# Patient Record
Sex: MALE | Race: WHITE | Employment: OTHER | URBAN - METROPOLITAN AREA
[De-identification: names, ages, dates, MRNs, and addresses within clinical notes are randomized per-mention and may not be internally consistent; named-entity substitution may affect disease eponyms.]

---

## 2022-06-16 ENCOUNTER — ANESTHESIA (OUTPATIENT)
Dept: ENDOSCOPY | Age: 87
DRG: 377 | End: 2022-06-16
Payer: MEDICARE

## 2022-06-16 ENCOUNTER — HOSPITAL ENCOUNTER (INPATIENT)
Age: 87
LOS: 4 days | Discharge: HOME HEALTH | DRG: 377 | End: 2022-06-20
Attending: INTERNAL MEDICINE | Admitting: INTERNAL MEDICINE
Payer: MEDICARE

## 2022-06-16 ENCOUNTER — ANESTHESIA EVENT (OUTPATIENT)
Dept: ENDOSCOPY | Age: 87
DRG: 377 | End: 2022-06-16
Payer: MEDICARE

## 2022-06-16 PROBLEM — K25.9 GASTRIC ULCER: Status: ACTIVE | Noted: 2022-06-16

## 2022-06-16 PROBLEM — I48.91 ATRIAL FIBRILLATION (HCC): Status: ACTIVE | Noted: 2022-06-16

## 2022-06-16 PROBLEM — I10 ESSENTIAL HYPERTENSION: Status: ACTIVE | Noted: 2022-06-16

## 2022-06-16 PROBLEM — N40.0 BPH (BENIGN PROSTATIC HYPERPLASIA): Status: ACTIVE | Noted: 2022-06-16

## 2022-06-16 PROBLEM — I48.91 ATRIAL FIBRILLATION WITH RAPID VENTRICULAR RESPONSE (HCC): Status: ACTIVE | Noted: 2022-06-16

## 2022-06-16 PROBLEM — J44.9 COPD (CHRONIC OBSTRUCTIVE PULMONARY DISEASE) (HCC): Status: ACTIVE | Noted: 2022-06-16

## 2022-06-16 PROBLEM — K92.2 GI BLEED: Status: ACTIVE | Noted: 2022-06-16

## 2022-06-16 PROBLEM — I50.32 CHRONIC DIASTOLIC (CONGESTIVE) HEART FAILURE (HCC): Status: ACTIVE | Noted: 2022-06-16

## 2022-06-16 PROBLEM — I35.0 AORTIC STENOSIS: Status: ACTIVE | Noted: 2022-06-16

## 2022-06-16 PROBLEM — I25.10 CAD (CORONARY ARTERY DISEASE): Status: ACTIVE | Noted: 2022-06-16

## 2022-06-16 PROBLEM — E03.9 ACQUIRED HYPOTHYROIDISM: Status: ACTIVE | Noted: 2022-06-16

## 2022-06-16 LAB
A/G RATIO: 1.1 (ref 1.1–2.2)
ALBUMIN SERPL-MCNC: 2.7 G/DL (ref 3.4–5)
ALP BLD-CCNC: 51 U/L (ref 40–129)
ALT SERPL-CCNC: 6 U/L (ref 10–40)
ANION GAP SERPL CALCULATED.3IONS-SCNC: 7 MMOL/L (ref 3–16)
AST SERPL-CCNC: 13 U/L (ref 15–37)
BASOPHILS ABSOLUTE: 0 K/UL (ref 0–0.2)
BASOPHILS RELATIVE PERCENT: 0.5 %
BILIRUB SERPL-MCNC: 0.4 MG/DL (ref 0–1)
BUN BLDV-MCNC: 28 MG/DL (ref 7–20)
CALCIUM SERPL-MCNC: 8.2 MG/DL (ref 8.3–10.6)
CHLORIDE BLD-SCNC: 105 MMOL/L (ref 99–110)
CO2: 27 MMOL/L (ref 21–32)
CREAT SERPL-MCNC: 1.1 MG/DL (ref 0.8–1.3)
EOSINOPHILS ABSOLUTE: 0.4 K/UL (ref 0–0.6)
EOSINOPHILS RELATIVE PERCENT: 4.6 %
GFR AFRICAN AMERICAN: >60
GFR NON-AFRICAN AMERICAN: >60
GLUCOSE BLD-MCNC: 109 MG/DL (ref 70–99)
HCT VFR BLD CALC: 23.4 % (ref 40.5–52.5)
HCT VFR BLD CALC: 23.6 % (ref 40.5–52.5)
HCT VFR BLD CALC: 24.3 % (ref 40.5–52.5)
HCT VFR BLD CALC: 25.1 % (ref 40.5–52.5)
HEMOGLOBIN: 7.6 G/DL (ref 13.5–17.5)
HEMOGLOBIN: 7.7 G/DL (ref 13.5–17.5)
HEMOGLOBIN: 8.1 G/DL (ref 13.5–17.5)
HEMOGLOBIN: 8.1 G/DL (ref 13.5–17.5)
LYMPHOCYTES ABSOLUTE: 0.7 K/UL (ref 1–5.1)
LYMPHOCYTES RELATIVE PERCENT: 9.4 %
MCH RBC QN AUTO: 28.9 PG (ref 26–34)
MCHC RBC AUTO-ENTMCNC: 33.4 G/DL (ref 31–36)
MCV RBC AUTO: 86.6 FL (ref 80–100)
MONOCYTES ABSOLUTE: 0.6 K/UL (ref 0–1.3)
MONOCYTES RELATIVE PERCENT: 7.3 %
NEUTROPHILS ABSOLUTE: 6.1 K/UL (ref 1.7–7.7)
NEUTROPHILS RELATIVE PERCENT: 78.2 %
PDW BLD-RTO: 17.3 % (ref 12.4–15.4)
PLATELET # BLD: 237 K/UL (ref 135–450)
PMV BLD AUTO: 7.1 FL (ref 5–10.5)
POTASSIUM REFLEX MAGNESIUM: 4.5 MMOL/L (ref 3.5–5.1)
RBC # BLD: 2.8 M/UL (ref 4.2–5.9)
SODIUM BLD-SCNC: 139 MMOL/L (ref 136–145)
T4 FREE: 1.3 NG/DL (ref 0.9–1.8)
TOTAL PROTEIN: 5.1 G/DL (ref 6.4–8.2)
TSH REFLEX: 7.29 UIU/ML (ref 0.27–4.2)
WBC # BLD: 7.8 K/UL (ref 4–11)

## 2022-06-16 PROCEDURE — 1200000000 HC SEMI PRIVATE

## 2022-06-16 PROCEDURE — 6370000000 HC RX 637 (ALT 250 FOR IP): Performed by: INTERNAL MEDICINE

## 2022-06-16 PROCEDURE — 6360000002 HC RX W HCPCS: Performed by: NURSE ANESTHETIST, CERTIFIED REGISTERED

## 2022-06-16 PROCEDURE — 6360000002 HC RX W HCPCS: Performed by: INTERNAL MEDICINE

## 2022-06-16 PROCEDURE — 7100000011 HC PHASE II RECOVERY - ADDTL 15 MIN: Performed by: INTERNAL MEDICINE

## 2022-06-16 PROCEDURE — 99223 1ST HOSP IP/OBS HIGH 75: CPT | Performed by: NURSE PRACTITIONER

## 2022-06-16 PROCEDURE — 94761 N-INVAS EAR/PLS OXIMETRY MLT: CPT

## 2022-06-16 PROCEDURE — 85014 HEMATOCRIT: CPT

## 2022-06-16 PROCEDURE — 2700000000 HC OXYGEN THERAPY PER DAY

## 2022-06-16 PROCEDURE — 85025 COMPLETE CBC W/AUTO DIFF WBC: CPT

## 2022-06-16 PROCEDURE — C9113 INJ PANTOPRAZOLE SODIUM, VIA: HCPCS | Performed by: INTERNAL MEDICINE

## 2022-06-16 PROCEDURE — 0DJ08ZZ INSPECTION OF UPPER INTESTINAL TRACT, VIA NATURAL OR ARTIFICIAL OPENING ENDOSCOPIC: ICD-10-PCS | Performed by: INTERNAL MEDICINE

## 2022-06-16 PROCEDURE — 2580000003 HC RX 258: Performed by: INTERNAL MEDICINE

## 2022-06-16 PROCEDURE — 3700000000 HC ANESTHESIA ATTENDED CARE: Performed by: INTERNAL MEDICINE

## 2022-06-16 PROCEDURE — 94640 AIRWAY INHALATION TREATMENT: CPT

## 2022-06-16 PROCEDURE — 36415 COLL VENOUS BLD VENIPUNCTURE: CPT

## 2022-06-16 PROCEDURE — 2500000003 HC RX 250 WO HCPCS: Performed by: NURSE ANESTHETIST, CERTIFIED REGISTERED

## 2022-06-16 PROCEDURE — 80053 COMPREHEN METABOLIC PANEL: CPT

## 2022-06-16 PROCEDURE — 7100000010 HC PHASE II RECOVERY - FIRST 15 MIN: Performed by: INTERNAL MEDICINE

## 2022-06-16 PROCEDURE — 84443 ASSAY THYROID STIM HORMONE: CPT

## 2022-06-16 PROCEDURE — 3700000001 HC ADD 15 MINUTES (ANESTHESIA): Performed by: INTERNAL MEDICINE

## 2022-06-16 PROCEDURE — 85018 HEMOGLOBIN: CPT

## 2022-06-16 PROCEDURE — 84439 ASSAY OF FREE THYROXINE: CPT

## 2022-06-16 PROCEDURE — 2500000003 HC RX 250 WO HCPCS: Performed by: INTERNAL MEDICINE

## 2022-06-16 PROCEDURE — 2580000003 HC RX 258: Performed by: NURSE ANESTHETIST, CERTIFIED REGISTERED

## 2022-06-16 PROCEDURE — 3609017100 HC EGD: Performed by: INTERNAL MEDICINE

## 2022-06-16 PROCEDURE — 6370000000 HC RX 637 (ALT 250 FOR IP): Performed by: NURSE PRACTITIONER

## 2022-06-16 RX ORDER — LEVOTHYROXINE SODIUM 0.05 MG/1
50 TABLET ORAL DAILY
Status: DISCONTINUED | OUTPATIENT
Start: 2022-06-16 | End: 2022-06-20 | Stop reason: HOSPADM

## 2022-06-16 RX ORDER — DILTIAZEM HYDROCHLORIDE 120 MG/1
120 CAPSULE, EXTENDED RELEASE ORAL 2 TIMES DAILY
COMMUNITY

## 2022-06-16 RX ORDER — TAMSULOSIN HYDROCHLORIDE 0.4 MG/1
CAPSULE ORAL
COMMUNITY

## 2022-06-16 RX ORDER — DILTIAZEM HYDROCHLORIDE 60 MG/1
60 TABLET, FILM COATED ORAL EVERY 6 HOURS SCHEDULED
Status: DISCONTINUED | OUTPATIENT
Start: 2022-06-16 | End: 2022-06-20

## 2022-06-16 RX ORDER — ONDANSETRON 2 MG/ML
4 INJECTION INTRAMUSCULAR; INTRAVENOUS EVERY 4 HOURS PRN
Status: DISCONTINUED | OUTPATIENT
Start: 2022-06-16 | End: 2022-06-20 | Stop reason: HOSPADM

## 2022-06-16 RX ORDER — APIXABAN 5 MG/1
5 TABLET, FILM COATED ORAL 2 TIMES DAILY
Status: ON HOLD | COMMUNITY
End: 2022-06-20 | Stop reason: HOSPADM

## 2022-06-16 RX ORDER — LANOLIN ALCOHOL/MO/W.PET/CERES
325 CREAM (GRAM) TOPICAL 2 TIMES DAILY
COMMUNITY

## 2022-06-16 RX ORDER — LIDOCAINE HYDROCHLORIDE 20 MG/ML
INJECTION, SOLUTION INFILTRATION; PERINEURAL PRN
Status: DISCONTINUED | OUTPATIENT
Start: 2022-06-16 | End: 2022-06-16 | Stop reason: SDUPTHER

## 2022-06-16 RX ORDER — SODIUM CHLORIDE 9 MG/ML
INJECTION, SOLUTION INTRAVENOUS PRN
Status: DISCONTINUED | OUTPATIENT
Start: 2022-06-16 | End: 2022-06-20 | Stop reason: HOSPADM

## 2022-06-16 RX ORDER — TAMSULOSIN HYDROCHLORIDE 0.4 MG/1
0.4 CAPSULE ORAL DAILY
Status: DISCONTINUED | OUTPATIENT
Start: 2022-06-16 | End: 2022-06-20 | Stop reason: HOSPADM

## 2022-06-16 RX ORDER — METOPROLOL SUCCINATE 100 MG/1
100 TABLET, EXTENDED RELEASE ORAL DAILY
Status: DISCONTINUED | OUTPATIENT
Start: 2022-06-16 | End: 2022-06-20 | Stop reason: HOSPADM

## 2022-06-16 RX ORDER — TIOTROPIUM BROMIDE INHALATION SPRAY 3.12 UG/1
SPRAY, METERED RESPIRATORY (INHALATION)
COMMUNITY

## 2022-06-16 RX ORDER — ALBUTEROL SULFATE 2.5 MG/3ML
2.5 SOLUTION RESPIRATORY (INHALATION) EVERY 6 HOURS PRN
Status: DISCONTINUED | OUTPATIENT
Start: 2022-06-16 | End: 2022-06-19

## 2022-06-16 RX ORDER — SODIUM CHLORIDE 9 MG/ML
INJECTION, SOLUTION INTRAVENOUS CONTINUOUS
Status: DISCONTINUED | OUTPATIENT
Start: 2022-06-16 | End: 2022-06-17

## 2022-06-16 RX ORDER — ACETAMINOPHEN 325 MG/1
650 TABLET ORAL EVERY 4 HOURS PRN
Status: DISCONTINUED | OUTPATIENT
Start: 2022-06-16 | End: 2022-06-20 | Stop reason: HOSPADM

## 2022-06-16 RX ORDER — OMEPRAZOLE 40 MG/1
40 CAPSULE, DELAYED RELEASE ORAL DAILY
COMMUNITY

## 2022-06-16 RX ORDER — FUROSEMIDE 20 MG/1
20 TABLET ORAL 2 TIMES DAILY
COMMUNITY

## 2022-06-16 RX ORDER — ACETAMINOPHEN 650 MG/1
650 SUPPOSITORY RECTAL EVERY 4 HOURS PRN
Status: DISCONTINUED | OUTPATIENT
Start: 2022-06-16 | End: 2022-06-20 | Stop reason: HOSPADM

## 2022-06-16 RX ORDER — CALCIUM CARBONATE 200(500)MG
500 TABLET,CHEWABLE ORAL 3 TIMES DAILY PRN
Status: DISCONTINUED | OUTPATIENT
Start: 2022-06-16 | End: 2022-06-20 | Stop reason: HOSPADM

## 2022-06-16 RX ORDER — PANTOPRAZOLE SODIUM 40 MG/10ML
40 INJECTION, POWDER, LYOPHILIZED, FOR SOLUTION INTRAVENOUS 2 TIMES DAILY
Status: DISCONTINUED | OUTPATIENT
Start: 2022-06-16 | End: 2022-06-18 | Stop reason: ALTCHOICE

## 2022-06-16 RX ORDER — ALBUTEROL SULFATE 90 UG/1
1-2 AEROSOL, METERED RESPIRATORY (INHALATION) EVERY 6 HOURS PRN
COMMUNITY

## 2022-06-16 RX ORDER — POLYETHYLENE GLYCOL 3350 17 G/17G
17 POWDER, FOR SOLUTION ORAL DAILY PRN
Status: DISCONTINUED | OUTPATIENT
Start: 2022-06-16 | End: 2022-06-20 | Stop reason: HOSPADM

## 2022-06-16 RX ORDER — SODIUM CHLORIDE 0.9 % (FLUSH) 0.9 %
10 SYRINGE (ML) INJECTION PRN
Status: DISCONTINUED | OUTPATIENT
Start: 2022-06-16 | End: 2022-06-20 | Stop reason: HOSPADM

## 2022-06-16 RX ORDER — SODIUM CHLORIDE 0.9 % (FLUSH) 0.9 %
10 SYRINGE (ML) INJECTION EVERY 12 HOURS SCHEDULED
Status: DISCONTINUED | OUTPATIENT
Start: 2022-06-16 | End: 2022-06-20 | Stop reason: HOSPADM

## 2022-06-16 RX ORDER — SODIUM CHLORIDE, SODIUM LACTATE, POTASSIUM CHLORIDE, CALCIUM CHLORIDE 600; 310; 30; 20 MG/100ML; MG/100ML; MG/100ML; MG/100ML
INJECTION, SOLUTION INTRAVENOUS CONTINUOUS PRN
Status: DISCONTINUED | OUTPATIENT
Start: 2022-06-16 | End: 2022-06-16 | Stop reason: SDUPTHER

## 2022-06-16 RX ORDER — PROPOFOL 10 MG/ML
INJECTION, EMULSION INTRAVENOUS CONTINUOUS PRN
Status: DISCONTINUED | OUTPATIENT
Start: 2022-06-16 | End: 2022-06-16 | Stop reason: SDUPTHER

## 2022-06-16 RX ORDER — METOPROLOL SUCCINATE 100 MG/1
100 TABLET, EXTENDED RELEASE ORAL DAILY
COMMUNITY

## 2022-06-16 RX ORDER — FLUTICASONE PROPIONATE 100 MCG
1 BLISTER, WITH INHALATION DEVICE INHALATION 2 TIMES DAILY
COMMUNITY

## 2022-06-16 RX ORDER — LEVOTHYROXINE SODIUM 50 UG/1
50 TABLET ORAL DAILY
COMMUNITY

## 2022-06-16 RX ADMIN — METOPROLOL SUCCINATE 100 MG: 100 TABLET, EXTENDED RELEASE ORAL at 08:27

## 2022-06-16 RX ADMIN — DILTIAZEM HYDROCHLORIDE 10 MG/HR: 5 INJECTION INTRAVENOUS at 14:53

## 2022-06-16 RX ADMIN — LEVOTHYROXINE SODIUM 50 MCG: 50 TABLET ORAL at 08:27

## 2022-06-16 RX ADMIN — SODIUM CHLORIDE: 9 INJECTION, SOLUTION INTRAVENOUS at 03:23

## 2022-06-16 RX ADMIN — DILTIAZEM HYDROCHLORIDE 5 MG/HR: 5 INJECTION INTRAVENOUS at 02:57

## 2022-06-16 RX ADMIN — SODIUM CHLORIDE 8 MG/HR: 9 INJECTION, SOLUTION INTRAVENOUS at 10:10

## 2022-06-16 RX ADMIN — SODIUM CHLORIDE, SODIUM LACTATE, POTASSIUM CHLORIDE, AND CALCIUM CHLORIDE: .6; .31; .03; .02 INJECTION, SOLUTION INTRAVENOUS at 11:11

## 2022-06-16 RX ADMIN — SODIUM CHLORIDE, PRESERVATIVE FREE 10 ML: 5 INJECTION INTRAVENOUS at 21:50

## 2022-06-16 RX ADMIN — ALBUTEROL SULFATE 2.5 MG: 2.5 SOLUTION RESPIRATORY (INHALATION) at 22:05

## 2022-06-16 RX ADMIN — PROPOFOL 50 MCG/KG/MIN: 10 INJECTION, EMULSION INTRAVENOUS at 11:18

## 2022-06-16 RX ADMIN — SODIUM CHLORIDE 8 MG/HR: 9 INJECTION, SOLUTION INTRAVENOUS at 02:53

## 2022-06-16 RX ADMIN — ANTACID TABLETS 500 MG: 500 TABLET, CHEWABLE ORAL at 21:50

## 2022-06-16 RX ADMIN — SODIUM CHLORIDE, PRESERVATIVE FREE 10 ML: 5 INJECTION INTRAVENOUS at 08:27

## 2022-06-16 RX ADMIN — PANTOPRAZOLE SODIUM 40 MG: 40 INJECTION, POWDER, FOR SOLUTION INTRAVENOUS at 21:50

## 2022-06-16 RX ADMIN — TAMSULOSIN HYDROCHLORIDE 0.4 MG: 0.4 CAPSULE ORAL at 08:27

## 2022-06-16 RX ADMIN — PANTOPRAZOLE SODIUM 40 MG: 40 INJECTION, POWDER, FOR SOLUTION INTRAVENOUS at 14:50

## 2022-06-16 RX ADMIN — DILTIAZEM HYDROCHLORIDE 60 MG: 60 TABLET, FILM COATED ORAL at 17:39

## 2022-06-16 RX ADMIN — LIDOCAINE HYDROCHLORIDE 60 MG: 20 INJECTION, SOLUTION INFILTRATION; PERINEURAL at 11:18

## 2022-06-16 RX ADMIN — SODIUM CHLORIDE: 9 INJECTION, SOLUTION INTRAVENOUS at 14:49

## 2022-06-16 ASSESSMENT — PAIN DESCRIPTION - LOCATION
LOCATION: ABDOMEN

## 2022-06-16 ASSESSMENT — PAIN SCALES - GENERAL
PAINLEVEL_OUTOF10: 5
PAINLEVEL_OUTOF10: 3
PAINLEVEL_OUTOF10: 5
PAINLEVEL_OUTOF10: 5
PAINLEVEL_OUTOF10: 4

## 2022-06-16 ASSESSMENT — PAIN DESCRIPTION - ORIENTATION
ORIENTATION: LOWER

## 2022-06-16 ASSESSMENT — PAIN - FUNCTIONAL ASSESSMENT
PAIN_FUNCTIONAL_ASSESSMENT: ACTIVITIES ARE NOT PREVENTED

## 2022-06-16 ASSESSMENT — PAIN DESCRIPTION - PAIN TYPE
TYPE: ACUTE PAIN

## 2022-06-16 ASSESSMENT — PAIN DESCRIPTION - ONSET
ONSET: ON-GOING
ONSET: ON-GOING
ONSET: GRADUAL

## 2022-06-16 ASSESSMENT — PAIN DESCRIPTION - DESCRIPTORS
DESCRIPTORS: DISCOMFORT
DESCRIPTORS: DISCOMFORT
DESCRIPTORS: ACHING
DESCRIPTORS: ACHING

## 2022-06-16 ASSESSMENT — COPD QUESTIONNAIRES: CAT_SEVERITY: MODERATE

## 2022-06-16 NOTE — PROGRESS NOTES
EGD consent reviewed with pt and SALVADOR Flores.  Signed and placed in chart    Electronically signed by Naya Olivia RN on 6/16/2022 at 10:39 AM

## 2022-06-16 NOTE — CONSULTS
[address]   Electrophysiology Nurse Practitioner  Consult Rounding    Date: 6/16/2022    Reason for Consultation/ Chief Complaint: AF/RVR    History Obtained From: Patient and medical record. Cardiac Hx: Herson Adames is a 81 y.o. man w/ PMH HTN, hypothyroidism, CAD, COPD, severe AS with planned TAVR 4/7623, chronic diastolic HF, persistent AF, GIB who p/w BRBPR and abdominal pain, given 2 units PRBC, found to be in AF/RVR, placed on diltiazem gtt    HPI: Pt presented to [facility] with concerns of BRBPR and abdominal pain. Found to have acute blood loss anemia and was transfused with 2 units PRBC. Noted to be in AF/RVR, placed on diltiazem gtt with some improvement in rates. Plan for EGD today. No c/o CP, palps, dizziness, LH, BLE edema. Does have some c/o fatigue. Wears oxygen at baseline[place]. Per son in law at bedside, no know history of antiarrhythmic therapy for AF on patient. Home medications:   No current facility-administered medications on file prior to encounter.      Current Outpatient Medications on File Prior to Encounter   Medication Sig Dispense Refill    SPIRIVA RESPIMAT 2.5 MCG/ACT AERS inhaler INHALE 2 PUFFS BY MOUTH ONCE DAILY AT THE SAME TIME EVERY DAY      albuterol sulfate HFA (PROVENTIL;VENTOLIN;PROAIR) 108 (90 Base) MCG/ACT inhaler Inhale 1-2 puffs into the lungs every 6 hours as needed      ferrous sulfate (FE TABS 325) 325 (65 Fe) MG EC tablet Take 325 mg by mouth in the morning and at bedtime      tamsulosin (FLOMAX) 0.4 MG capsule TAKE 1 CAPSULE BY MOUTH ONCE DAILY 30 MINUTES FOLLOWING THE SAME MEAL EVERYDAY      omeprazole (PRILOSEC) 40 MG delayed release capsule Take 40 mg by mouth daily      metoprolol succinate (TOPROL XL) 100 MG extended release tablet Take 100 mg by mouth daily      EUTHYROX 50 MCG tablet Take 50 mcg by mouth daily      furosemide (LASIX) 20 MG tablet Take 20 mg by mouth in the morning and at bedtime      fluticasone propionate (FLOVENT DISKUS) 100 MCG/BLIST AEPB inhaler Inhale 1 puff into the lungs 2 times daily      dilTIAZem (TIAZAC) 120 MG extended release capsule Take 120 mg by mouth 2 times daily      ELIQUIS 5 MG TABS tablet Take 5 mg by mouth in the morning and at bedtime      fluocinolone 0.01 % cream Apply topically 2 times daily         Scheduled Meds:   sodium chloride flush  10 mL IntraVENous 2 times per day    levothyroxine  50 mcg Oral Daily    tamsulosin  0.4 mg Oral Daily    metoprolol succinate  100 mg Oral Daily     Continuous Infusions:   dilTIAZem 10 mg/hr (06/16/22 0826)    pantoprozole (PROTONIX) infusion 8 mg/hr (06/16/22 0500)    sodium chloride      sodium chloride 75 mL/hr at 06/16/22 0500     PRN Meds:sodium chloride flush, sodium chloride, ondansetron, polyethylene glycol, acetaminophen **OR** acetaminophen, albuterol       Past Medical History:   Diagnosis Date    Acquired hypothyroidism     Aortic stenosis     Atrial fibrillation (HCC)     BPH (benign prostatic hyperplasia)     Chronic diastolic (congestive) heart failure ([address]     COPD [facility] disease) [facility] [address]     Essential hypertension     Gastric ulcer     per EGD at [facility] 04/11/2022        No past surgical history on file. No Known Allergies    Social History:  Reviewed. Family History:  Reviewed. family history is not on file. Review of System:    · Constitutional: No fevers, chills. · Eyes: No visual changes or diplopia. No scleral icterus. · ENT: No Headaches. No mouth sores or sore throat. · Cardiovascular: No for chest pain, No for dyspnea on exertion, No for palpitations or No for loss of consciousness. No cough, hemoptysis, No for pleuritic pain, or phlebitis. · Respiratory: No for cough or wheezing. No hematemesis. · Gastrointestinal: No abdominal pain,yes for blood in stools.    · Musculoskeletal: No gait disturbance, · Integumentary: No rash or pruritis. · Neurological: No headache, change in muscle strength, numbness or tingling. · Psychiatric: No anxiety, or depression. Physical Examination:  Vitals:    22 0815   BP: 107/82   Pulse: (!) 114   Resp: 17   Temp: 97.4 °F (36.3 °C)   SpO2: 97%      In: 176.1 [P.O.:25; I.V.:151.1]  Out: -    Wt Readings from Last 3 Encounters:   22 145 lb 8.1 oz (66 kg)     Temp  Av °F (36.7 °C)  Min: 97.4 °F (36.3 °C)  Max: 98.2 °F (36.8 °C)  Pulse  Av.8  Min: 114  Max: 132  BP  Min: 107/82  Max: 133/83  SpO2  Av.5 %  Min: 97 %  Max: 98 %    Intake/Output Summary (Last 24 hours) at 2022 0937  Last data filed at 2022 0500  Gross per 24 hour   Intake 176.11 ml   Output --   Net 176.11 ml       · Constitutional: Oriented. No distress. · Head: Normocephalic and atraumatic. · Neck: Neck supple. No rigidity. No JVD present. · Cardiovascular: Normal rate, regular rhythm, S1&S2. · Pulmonary/Chest: Bilateral respiratory sounds. No wheezes, No rhonchi. · Musculoskeletal: No tenderness. No edema    · Neurological: Alert and oriented. · Skin: Skin is warm and dry. No rash noted. · Psychiatric: Has a normal mood, affect and behavior     Labs:  Reviewed. Recent Labs     22  0509      K 4.5      CO2 27   BUN 28*   CREATININE 1.1     Recent Labs     22  0509 22  0824   WBC 7.8  --    HGB 8.1* 8.1*   HCT 24.3* 25.1*   MCV 86.6  --      --      No results found for: CKTOTAL, CKMB, CKMBINDEX, TROPONINI  No results found for: BNP  No results found for: PROTIME, INR  No results found for: CHOL, HDL, TRIG    Telemetry: Personally reviewed: AF w/ HR up to 130's    Radiography: Personally reviewed: n/a    ECG: Personally reviewed: AF    ECHO:  2022, [facility]  Study Conclusions     - Left ventricle:  The cavity size is normal. Wall thickness is     normal. Systolic function was normal. The calculated ejection   fraction was in the range of 64% to 68%, by visual assessment.     Wall motion was normal; there were no regional wall motion     abnormalities. Reason unable to assess diastolic dysfunction: Due     to atrial fibrillation. The stroke volume is 34ml. The stroke     index is 20ml/m^2. The global longitudinal strain was -12%. - Aortic valve: Cusp separation was reduced. Transvalvular velocity     is increased less than expected, due to low cardiac output. There     is paradoxical low flow, low gradient severe stenosis. The stroke     volume index is reduced at 20 ml/m2. The mean systolic gradient     is 41PG Hg. The peak systolic gradient is 27JE Hg. The valve area     by the velocity-time integral method is 0.5cm^2. The valve area     index by the velocity-time integral method is 0.31cm^2/m^2. The     valve area by the mean velocity method is 0.7cm^2. - Mitral valve: The annulus is mildly calcified. The leaflets are     mildly thickened and mildly calcified. There was moderate     regurgitation. The valve area (LVOT continuity) is 5.7cm^2.   - Left atrium: The atrium is massively dilated. - Right atrium: The atrium is massively dilated. - Tricuspid valve: There was moderate-severe regurgitation.   - Inferior vena cava: The vessel was normal in size; the     respirophasic diameter changes were in the normal range (&gt;= 50%);     findings are consistent with normal central venous pressure. - Pulmonary arteries: The peak pressure during systole by Doppler     is 39mm Hg. - Pericardium, extracardiac: There was a small right pleural     effusion and a large left pleural effusion. Stress Test: n/a    Cardiac Angiography:6/9/22, Dr. Bhavna Fischer, [facility]    Coronary Findings Diagnostic Dominance: Left  Left Main: The vessel is large. Very short left main artery without significant stenosis   Left Anterior Descending: The vessel is moderate in size. First Diagonal Branch: The vessel is large.  Early takeoff 1st Diag lesion is 30% stenosed. Left Circumflex: The vessel is large and is angiographically normal.   Right Coronary Artery: The vessel is small. Prox RCA lesion is 40% stenosed. Intervention  No interventions have been documented. Historical Details  82 yo man with permanent AF and moderate CAD with class 3 MALIK, transudative pleural effusion L>R and paradoxical low flow, low gradient, low SVI, preserved LV systolic function severe AS.      Problem List:   Patient Active Problem List    Diagnosis Date Noted    GI bleed 06/16/2022    Acquired hypothyroidism 06/16/2022    Aortic stenosis 06/16/2022    Atrial fibrillation ([facility] [address] 06/16/2022    BPH (benign prostatic hyperplasia) 06/16/2022    COPD (chronic obstructive pulmonary disease) ([facility] [address] 06/16/2022    Essential hypertension 06/16/2022    Chronic diastolic (congestive) heart failure ([facility] [address] 06/16/2022    Gastric ulcer 06/16/2022    Atrial fibrillation with rapid ventricular response ([facility] [address] 06/16/2022    CAD (coronary artery disease) 06/16/2022        Assessment:   No diagnosis found. 1. GIB  2. AF/RVR  3. Severe AS    AF/RVR  - In AF, rates ranging from  bpm  - CHADSVASc at least 4  - No OAC d/t bleeding, may need to consider watchman outpt  - On dilt gtt- continue for now, will reassess tele this afternoon  - On Toprol 100 mg QD  - Continuous tele  - Keep K >4.0, Mg >2.0  - TSH pending  - Reviewed recent echo from [facility] (5/2022)  - [address] [facility] cardiology at [facility]  - Will review w/ Dr. Shimon Trammell  - ECG ordered and results personally reviewed     Severe AS  - Seen on Echo   - Plan for TAVR at [facility] 7/2022     GIB  - Protonix gtt  - hold asa, Eliquis  - plan for EGD today    EF of 03-24%  No known systolic HF  ASA and Statin for CAD  Anticoagulation for AF  No tobacco use. All questions and concerns were addressed to the patient/family. Alternatives to my treatment were discussed. The note was completed using EMR.  Every effort was made to ensure accuracy; however, inadvertent computerized transcription errors may be present.      Jaylene Roberts, JACOBY  [address]

## 2022-06-16 NOTE — ANESTHESIA POSTPROCEDURE EVALUATION
Department of Anesthesiology  Postprocedure Note    Patient: Francisco Ruiz. MRN: [id number]  YOB: 1935  Date of evaluation: 6/16/2022  Time:  12:26 PM     Procedure Summary     Date: 06/16/22 Room / Location: [place] [id number] / [facility]    Anesthesia Start: 0066 Anesthesia Stop: 1127    Procedure: EGD ESOPHAGOGASTRODUODENOSCOPY (N/A ) Diagnosis:       Hematemesis, presence of nausea not specified      (Hematemesis, presence of nausea not specified [K92.0])    Surgeons: Suraj Ram MD Responsible Provider: Jose David Alexander MD    Anesthesia Type: MAC ASA Status: 4          Anesthesia Type: No value filed. Jorge Phase I: Jorge Score: 9    Jorge Phase II: Jorge Score: 8    Last vitals: Reviewed and per EMR flowsheets.        Anesthesia Post Evaluation    Patient location during evaluation: PACU  Level of consciousness: awake  Complications: no  Multimodal analgesia pain management approach

## 2022-06-16 NOTE — H&P
Hospital Medicine  History and Physical    PCP: No primary care provider on file. Patient Name: Aurora Sears Date of Service: Pt seen/examined on 6/16/22 and admitted to Inpatient with expected LOS greater than two midnights due to medical therapy    CHIEF COMPLAINT:  GI bleed   HISTORY OF PRESENT ILLNESS: Pt is an 81y.o. year-old male with a history of hypertension, hypothyroidism, BPH, COPD, coronary artery disease, chronic diastolic congestive heart failure and severe aortic stenosis. He also has a history of GI bleed and was found to have gastric ulcers on EGD at [facility] on April 8th of this year. He was recently hospitalized at [facility] in April of this year during which time he was treated for GI bleed. He underwent a colonoscopy that did not reveal the source of bleeding, he underwent an EGD which revealed gastric ulcers. They had him stop taking his aspirin and Eliquis for 2 weeks before restarting it at his normal doses. He presented to [facility] last evening for evaluation following 4 episodes of bright red blood per rectum that began on Tuesday night. He initially had some abdominal pain, but that subsided quickly. He was found to have acute blood loss anemia and they transfused 2 units of packed red blood cells in the emergency room. He was transferred to this hospital for further evaluation and treatment. Associated signs and symptoms do not include fever or chills, nausea, vomiting, current abdominal pain, melena, hematemesis, sweats, dark urine or jaundice.       Past Medical History:        Diagnosis Date    Acquired hypothyroidism     Aortic stenosis     Atrial fibrillation (HCC)     BPH (benign prostatic hyperplasia)     Chronic diastolic (congestive) heart failure (HCC)     COPD (chronic obstructive pulmonary disease) (HCC)     Essential hypertension     Gastric ulcer     per EGD at [address] 04/11/2022       Past Surgical History:    No past surgical history on file. Allergies:  Patient has no known allergies. Medications Prior to Admission:    Prior to Admission medications    Not on File       Family History:   Family history is negative for accelerated coronary artery disease, diabetes or malignancies. Social History:   TOBACCO:   has no history on file for tobacco use. ETOH:   has no history on file for alcohol use. OCCUPATION:      REVIEW OF SYSTEMS:  A full review of systems was performed and is negative except for that which appears in the HPI    Physical Exam:    Vitals: /83   Pulse (!) 127   Resp 27   General appearance: WD/WN 81y.o. year-old male who is alert, appears stated age and is cooperative  HEENT: Head: Normocephalic, no lesions, without obvious abnormality. Eye: Normal external eye, conjunctiva, lids cornea, PEERL. Ears: Normal external ears. Non-tender. Nose: Normal external nose, mucus membranes and septum. Pharynx: Dental Hygiene adequate. Normal buccal mucosa. Normal pharynx. Neck: no adenopathy, no carotid bruit, no JVD, supple, symmetrical, trachea midline and thyroid not enlarged, symmetric, no tenderness/mass/nodules  Lungs: clear to auscultation bilaterally and no use of accessory muscles  Heart: regular rate and rhythm, S1, S2 normal, IV/VI GENARO. No click, rub or gallop and normal apical impulse  Abdomen: soft, non-tender; bowel sounds normal; no masses, no organomegaly  Extremities: extremities atraumatic, no cyanosis or edema and Homans sign is negative, no sign of DVT. Capillary Refill: Acceptable < 3 seconds   Peripheral Pulses: +3 easily felt, not easily obliterated with pressures   Skin: Skin color, texture, turgor normal. No rashes or lesions on exposed skin  Neurologic: Neurovascularly intact without any focal sensory/motor deficits. Cranial nerves: II-XII intact, grossly non-focal. Gait was not tested.   Mental Status: Alert and oriented, thought content appropriate, normal insight        CBC: No results for input(s): WBC, HGB, PLT in the last 72 hours. BMP:  No results for input(s): NA, K, CL, CO2, BUN, CREATININE, GLUCOSE in the last 72 hours. Troponin: No results for input(s): TROPONINI in the last 72 hours. PT/INR:  No results found for: PTINR  U/A:  No results found for: LEUKOCYTESUR, NITRITE, RBCUA, SPECGRAV, UROBILINOGEN, BILIRUBINUR, BLOODU, GLUCOSEU, PROTEINU          Assessment:   Principal Problem:    GI bleed  Active Problems:    Acquired hypothyroidism    Aortic stenosis    BPH (benign prostatic hyperplasia)    COPD (chronic obstructive pulmonary disease) ([facility])    Essential hypertension    Chronic diastolic (congestive) heart failure ([facility])    Atrial fibrillation with rapid ventricular response ([facility])    CAD (coronary artery disease)  Resolved Problems:    * No resolved hospital problems. *      Plan:       GI bleed - recurrent. Current episode started Tuesday evening. Hold Eliquis an Aspirin. Start a Protonix drip. Keep NPO and consult Gastroenterology. - He was recently hospitalized at [facility] in April of this year during which time he was treated for GI bleed  - He underwent a colonoscopy that did not reveal the source of bleeding.   - He was found to have gastric ulcers on EGD at [facility] on April 8th of this year. - They had him stop taking his aspirin and Eliquis for 2 weeks before restarting it at his normal doses. Acute blood loss anemia-patient was transferred 2 units of packed red blood cells at [facility] emergency room on 6/15/2022 prior to being transferred to this facility. We will monitor his H&H closely and transfuse as necessary to maintain a hemoglobin of 8.0 or higher as he has a h/o CAD    Atrial fibrillation with rapid ventricular response ([address] - Started on a Cardizem drip.  Will check TSH and consult Cardiology    Aortic stenosis (severe) - Scheduled for TAVR at [facility] on 07/25/2022    CAD (coronary artery disease) - currently stable. Continue Metoprolol and hold Aspirin and Eliquis    Heart failure with preserved ejection fraction -he underwent an echocardiogram on 5/20/2022 with an ejection fraction of 64 to 68%. His diastolic function could not be assessed due to atrial fibrillation. We will consult cardiology to help in his evaluation and treatment    COPD (chronic obstructive pulmonary disease) - without acute exacerbation. Will provide Nebulizer treatments as needed. Patient will be monitored closely, and deep breathing and coughing will be encouraged while awake. Acquired hypothyroidism - stable; continue Levothyroxine. Will check Thyroid Function Tests    Essential (primary) hypertension - continue Metoprolol and Cardizem and monitor blood pressure. BPH (benign prostatic hyperplasia) - continue Flomax    - He was treated for Pneumonia approximately 1 month ago. Airspace disease still showing up on imaging, but no si/sx of acute disease. Monitor without current need for treatment                Code Status: Full Code  Diet: Diet NPO Exceptions are: Ice Chips, Sips of Water with Meds  DVT Prophylaxis: SCDs    (Advanced care planning has been discussed with patient and/or responsible family member and is reflected in the code status.  Further orders associated with this have been entered if appropriate)    Disposition: Anticipate that patient will remain in the hospital for 2 or more midnights depending on further evaluation and clinical course     Please note that over 50 minutes was spent in evaluating the patient, review of records and results, discussion with staff/family, etc.      Ramo Escobar MD

## 2022-06-16 NOTE — ANESTHESIA PRE PROCEDURE
Department of Anesthesiology  Preprocedure Note       Name:  Na Nickerson. Age:  81 y.o.  :  1935                                          MRN:  [id number]         Date:  2022      Surgeon: Gemma Munroe):  Estrada Shetty MD    Procedure: Procedure(s):  EGD ESOPHAGOGASTRODUODENOSCOPY    Medications prior to admission:   Prior to Admission medications    Medication Sig Start Date End Date Taking?  Authorizing Provider   SPIRIVA RESPIMAT 2.5 MCG/ACT AERS inhaler INHALE 2 PUFFS BY MOUTH ONCE DAILY AT THE SAME TIME EVERY DAY   Yes Historical Provider, MD   albuterol sulfate HFA (PROVENTIL;VENTOLIN;PROAIR) 108 (90 Base) MCG/ACT inhaler Inhale 1-2 puffs into the lungs every 6 hours as needed   Yes Historical Provider, MD   ferrous sulfate (FE TABS 325) 325 (65 Fe) MG EC tablet Take 325 mg by mouth in the morning and at bedtime   Yes Historical Provider, MD   tamsulosin (FLOMAX) 0.4 MG capsule TAKE 1 CAPSULE BY MOUTH ONCE DAILY 30 MINUTES FOLLOWING THE SAME MEAL EVERYDAY   Yes Historical Provider, MD   omeprazole (PRILOSEC) 40 MG delayed release capsule Take 40 mg by mouth daily   Yes Historical Provider, MD   metoprolol succinate (TOPROL XL) 100 MG extended release tablet Take 100 mg by mouth daily   Yes Historical Provider, MD   EUTHYROX 50 MCG tablet Take 50 mcg by mouth daily   Yes Historical Provider, MD   furosemide (LASIX) 20 MG tablet Take 20 mg by mouth in the morning and at bedtime   Yes Historical Provider, MD   fluticasone propionate (FLOVENT DISKUS) 100 MCG/BLIST AEPB inhaler Inhale 1 puff into the lungs 2 times daily   Yes Historical Provider, MD   dilTIAZem (TIAZAC) 120 MG extended release capsule Take 120 mg by mouth 2 times daily   Yes Historical Provider, MD   ELIQUIS 5 MG TABS tablet Take 5 mg by mouth in the morning and at bedtime   Yes Historical Provider, MD   fluocinolone 0.01 % cream Apply topically 2 times daily   Yes Historical Provider, MD       Current medications: Current Facility-Administered Medications   Medication Dose Route Frequency Provider Last Rate Last Admin    dilTIAZem 125 mg in dextrose 5 % 125 mL infusion  2.5-15 mg/hr IntraVENous Continuous Adam Hylton MD 10 mL/hr at 06/16/22 0826 10 mg/hr at 06/16/22 0826    pantoprazole (PROTONIX) 80 mg in sodium chloride 0.9 % 100 mL infusion  8 mg/hr IntraVENous Continuous Adam Hylton MD 10 mL/hr at 06/16/22 1010 8 mg/hr at 06/16/22 1010    sodium chloride flush 0.9 % injection 10 mL  10 mL IntraVENous 2 times per day Adam Hylton MD   10 mL at 06/16/22 0827    sodium chloride flush 0.9 % injection 10 mL  10 mL IntraVENous PRN Adam Hylton MD        0.9 % sodium chloride infusion   IntraVENous PRN Adam Hylton MD        [facility] injection 4 mg  4 mg IntraVENous Q4H PRN Adam Hylton MD        polyethylene glycol [facility]) packet 17 g  17 g Oral Daily PRN Adam Hylton MD        acetaminophen (TYLENOL) tablet 650 mg  650 mg Oral Q4H PRN Adam Hylton MD        Or    acetaminophen (TYLENOL) suppository 650 mg  650 mg Rectal Q4H PRN Adam Hylton MD        0.9 % sodium chloride infusion   IntraVENous Continuous Adam Hylton MD 75 mL/hr at 06/16/22 0500 Rate Verify at 06/16/22 0500    levothyroxine (SYNTHROID) tablet 50 mcg  50 mcg Oral Daily Adam Hylton MD   50 mcg at 06/16/22 0827    tamsulosin (FLOMAX) capsule 0.4 mg  0.4 mg Oral Daily Adam Hylton MD   0.4 mg at 06/16/22 0827    metoprolol succinate (TOPROL XL) extended release tablet 100 mg  100 mg Oral Daily Adam Hylton MD   100 mg at 06/16/22 0827    albuterol (PROVENTIL) nebulizer solution 2.5 mg  2.5 mg Nebulization Q6H PRN Adam Hylton MD           Allergies:  No Known Allergies    Problem List:    Patient Active Problem List   Diagnosis Code    GI bleed K92.2    Acquired hypothyroidism E03.9    Aortic stenosis I35.0    Atrial fibrillation (HCC) I48.91    BPH (benign prostatic hyperplasia) N40.0    COPD (chronic obstructive pulmonary disease) (HCC) J44.9    Essential hypertension I10    Chronic diastolic (congestive) heart failure (HCC) I50.32    Gastric ulcer K25.9    Atrial fibrillation with rapid ventricular response (HCC) I48.91    CAD (coronary artery disease) I25.10       Past Medical History:        Diagnosis Date    Acquired hypothyroidism     Aortic stenosis     Atrial fibrillation (HCC)     BPH (benign prostatic hyperplasia)     Chronic diastolic (congestive) heart failure (HCC)     COPD (chronic obstructive pulmonary disease) (HCC)     Essential hypertension     Gastric ulcer     per EGD at [facility] 04/11/2022       Past Surgical History:  No past surgical history on file. Social History:    Social History     Tobacco Use    Smoking status: Not on file    Smokeless tobacco: Not on file   Substance Use Topics    Alcohol use: Not on file                                Counseling given: Not Answered      Vital Signs (Current):   Vitals:    06/16/22 0500 06/16/22 0600 06/16/22 0815 06/16/22 1000   BP: 113/77 (!) 121/90 107/82 97/69   Pulse: (!) 122 (!) 121 (!) 114 90   Resp: 25 28 17 22   Temp:   36.3 °C (97.4 °F)    TempSrc:   Oral    SpO2:   97%    Weight:       Height:                                                  BP Readings from Last 3 Encounters:   06/16/22 97/69       NPO Status:                                                                                 BMI:   Wt Readings from Last 3 Encounters:   06/16/22 145 lb 8.1 oz (66 kg)     Body mass index is 22.79 kg/m².     CBC:   Lab Results   Component Value Date    WBC 7.8 06/16/2022    RBC 2.80 06/16/2022    HGB 8.1 06/16/2022    HCT 25.1 06/16/2022    MCV 86.6 06/16/2022    RDW 17.3 06/16/2022     06/16/2022       CMP:   Lab Results   Component Value Date     06/16/2022    K 4.5 06/16/2022     06/16/2022    CO2 27 06/16/2022    BUN 28 06/16/2022    CREATININE 1.1 06/16/2022 GFRAA >60 06/16/2022    AGRATIO 1.1 06/16/2022    LABGLOM >60 06/16/2022    GLUCOSE 109 06/16/2022    PROT 5.1 06/16/2022    CALCIUM 8.2 06/16/2022    BILITOT 0.4 06/16/2022    ALKPHOS 51 06/16/2022    AST 13 06/16/2022    ALT 6 06/16/2022       POC Tests: No results for input(s): POCGLU, POCNA, POCK, POCCL, POCBUN, POCHEMO, POCHCT in the last 72 hours. Coags: No results found for: PROTIME, INR, APTT    HCG (If Applicable): No results found for: PREGTESTUR, PREGSERUM, HCG, HCGQUANT     ABGs: No results found for: PHART, PO2ART, YNZ7ZDB, YJL6LSF, BEART, U8WFZHQM     Type & Screen (If Applicable):  No results found for: LABABO, LABRH    Drug/Infectious Status (If Applicable):  No results found for: HIV, HEPCAB    COVID-19 Screening (If Applicable): No results found for: COVID19        Anesthesia Evaluation    Airway: Mallampati: II  TM distance: >3 FB   Neck ROM: full  Mouth opening: > = 3 FB   Dental: normal exam         Pulmonary:normal exam    (+) COPD (2L NC): moderate,            Patient smoked on day of surgery. Cardiovascular:  Exercise tolerance: poor (<4 METS),   (+) hypertension:, valvular problems/murmurs (severe AI): AI, past MI: > 6 months, CAD:, CHF:,         Rhythm: regular                   ROS comment: Comments: 9/2021  Study Conclusions     - Left ventricle: The cavity size is normal. There is mild     concentric hypertrophy. Systolic function was normal. The     calculated ejection fraction was in the range of 55% to 60%. Wall     motion was normal; there were no regional wall motion     abnormalities. Reason unable to assess diastolic dysfunction: Due     to atrial fibrillation. The stroke index is 34ml/m^2. - Aortic valve: Thickening. Cusp separation was reduced.     Transvalvular velocity is increased. There is moderate to severe     stenosis. The peak systolic velocity is 452BU/KXC. The mean     systolic gradient is 66QP Hg. The peak systolic gradient is 97DP     Hg.  The LVOT to aortic valve VTI ratio is 0.28. The valve area by     the velocity-time integral method is 0.8cm^2. The valve area     index by the velocity-time integral method is 0.44cm^2/m^2. - Mitral valve: There was mild to moderate regurgitation.   - Left atrium: The atrium is moderately dilated. - Right atrium: The atrium is dilated. - Inferior vena cava: The vessel was normal in size; the     respirophasic diameter changes were in the normal range (>= 50%);     findings are consistent with normal central venous pressure. - Pulmonary arteries: The peak pressure during systole by Doppler     is 43mm Hg. Neuro/Psych:   Negative Neuro/Psych ROS              GI/Hepatic/Renal:   (+) PUD,           Endo/Other:    (+) hypothyroidism::., .                 Abdominal:             Vascular: negative vascular ROS. Other Findings:           Anesthesia Plan      MAC     ASA 4       Induction: intravenous. Anesthetic plan and risks discussed with patient. Use of blood products discussed with patient whom consented to blood products. Plan discussed with CRNA and attending.     Attending anesthesiologist reviewed and agrees with Preprocedure content                LYNNETTE Borrego - CRNA   6/16/2022

## 2022-06-16 NOTE — PLAN OF CARE
Problem: Discharge Planning  Goal: Discharge to home or other facility with appropriate resources  6/16/2022 1815 by Aureliano Yanez RN  Outcome: Progressing  Flowsheets (Taken 6/16/2022 7156)  Discharge to home or other facility with appropriate resources: Identify barriers to discharge with patient and caregiver     Problem: Skin/Tissue Integrity  Goal: Absence of new skin breakdown  Description: 1. Monitor for areas of redness and/or skin breakdown  2. Assess vascular access sites hourly  3. Every 4-6 hours minimum:  Change oxygen saturation probe site  4. Every 4-6 hours:  If on nasal continuous positive airway pressure, respiratory therapy assess nares and determine need for appliance change or resting period.   6/16/2022 1815 by Aureliano Yanez RN  Outcome: Progressing     Problem: Safety - Adult  Goal: Free from fall injury  6/16/2022 1815 by Aureliano Yanez RN  Outcome: Progressing  Flowsheets (Taken 6/16/2022 1815)  Free From Fall Injury: [place] family/caregiver on patient safety     Problem: Pain  Goal: Verbalizes/displays adequate comfort level or baseline comfort level  Flowsheets (Taken 6/16/2022 1815)  Verbalizes/displays adequate comfort level or baseline comfort level:   Encourage patient to monitor pain and request assistance   Assess pain using appropriate pain scale   Administer analgesics based on type and severity of pain and evaluate response     Problem: Chronic Conditions and Co-morbidities  Goal: Patient's chronic conditions and co-morbidity symptoms are monitored and maintained or improved  Flowsheets (Taken 6/16/2022 1815)  Care Plan - Patient's Chronic Conditions and Co-Morbidity Symptoms are Monitored and Maintained or Improved:   Monitor and assess patient's chronic conditions and comorbid symptoms for stability, deterioration, or improvement   Collaborate with multidisciplinary team to address chronic and comorbid conditions and prevent exacerbation or deterioration   Update acute care plan with appropriate goals if chronic or comorbid symptoms are exacerbated and prevent overall improvement and discharge     Problem: Cardiovascular - Adult  Goal: Maintains optimal cardiac output and hemodynamic stability  Outcome: Progressing  Flowsheets (Taken 6/16/2022 1817)  Maintains optimal cardiac output and hemodynamic stability:   Monitor blood pressure and heart rate   Monitor urine output and notify Licensed Independent Practitioner for values outside of normal range   Assess for signs of decreased cardiac output   Administer fluid and/or volume expanders as ordered     Problem: Gastrointestinal - Adult  Goal: Minimal or absence of nausea and vomiting  Outcome: Progressing  Flowsheets (Taken 6/16/2022 1817)  Minimal or absence of nausea and vomiting:   Administer IV fluids as ordered to ensure adequate hydration   Provide nonpharmacologic comfort measures as appropriate   Advance diet as tolerated, if ordered     Problem: Hematologic - Adult  Goal: Maintains hematologic stability  Outcome: Progressing  Flowsheets (Taken 6/16/2022 1817)  Maintains hematologic stability: Assess for signs and symptoms of bleeding or hemorrhage

## 2022-06-16 NOTE — RT PROTOCOL NOTE
RT Nebulizer Bronchodilator Protocol Note    There is a bronchodilator order in the chart from a provider indicating to follow the RT Bronchodilator Protocol and there is an Initiate RT Bronchodilator Protocol order as well (see protocol at bottom of note). CXR Findings:  No results found. The findings from the last RT Protocol Assessment were as follows:  Smoking: Chronic pulmonary disease  Respiratory Pattern: Regular pattern and RR 12-20 bpm  Breath Sounds: Clear breath sounds  Cough: Strong, spontaneous, non-productive  Indication for Bronchodilator Therapy: On home bronchodilators  Bronchodilator Assessment Score: 2    Aerosolized bronchodilator medication orders have been revised according to the RT Nebulizer Bronchodilator Protocol below. Respiratory Therapist to perform RT Therapy Protocol Assessment initially then follow the protocol. Repeat RT Therapy Protocol Assessment PRN for score 0-3 or on second treatment, BID, and PRN for scores above 3. No Indications - adjust the frequency to every 6 hours PRN wheezing or bronchospasm, if no treatments needed after 48 hours then discontinue using Per Protocol order mode. If indication present, adjust the RT bronchodilator orders based on the Bronchodilator Assessment Score as indicated below. If a patient is on this medication at home then do not decrease Frequency below that used at home. 0-3 - enter or revise RT bronchodilator order(s) to equivalent RT Bronchodilator order with Frequency of every 4 hours PRN for wheezing or increased work of breathing using Per Protocol order mode. 4-6 - enter or revise RT Bronchodilator order(s) to two equivalent RT bronchodilator orders with one order with BID Frequency and one order with Frequency of every 4 hours PRN wheezing or increased work of breathing using Per Protocol order mode.          7-10 - enter or revise RT Bronchodilator order(s) to two equivalent RT bronchodilator orders with one order with TID Frequency and one order with Frequency of every 4 hours PRN wheezing or increased work of breathing using Per Protocol order mode. 11-13 - enter or revise RT Bronchodilator order(s) to one equivalent RT bronchodilator order with QID Frequency and an Albuterol order with Frequency of every 4 hours PRN wheezing or increased work of breathing using Per Protocol order mode. Greater than 13 - enter or revise RT Bronchodilator order(s) to one equivalent RT bronchodilator order with every 4 hours Frequency and an Albuterol order with Frequency of every 2 hours PRN wheezing or increased work of breathing using Per Protocol order mode. RT to enter RT Home Evaluation for COPD & MDI Assessment order using Per Protocol order mode.     Electronically signed by Rios Red RCP on 6/16/2022 at 4:14 AM

## 2022-06-16 NOTE — CARE COORDINATION
Case Management Assessment           Initial Evaluation                Date / Time of Evaluation: 6/16/2022 11:41 AM                 Assessment Completed by: Sharri Castaneda RN    Patient Name: Cheli Chavez. YOB: 1935  Diagnosis: GI bleed [K92.2]     Date / Time: 6/16/2022  2:18 AM    Patient Admission Status: Inpatient    If patient is discharged prior to next notation, then this note serves as note for discharge by case management. Current PCP: No primary care provider on file. Clinic Patient: No    Chart Reviewed: Yes  Patient/ Family Interviewed: No    Initial assessment completed at bedside with: patient and son     Hospitalization in the last 30 days: No    Emergency Contacts:  Extended Emergency Contact Information  Primary Emergency Contact: ANNETTE TOM  Home Phone: [phone]  Relation: Son-in-Law   needed? No  Secondary Emergency Contact: [address] Phone: [phone]  Relation: Child   needed? No    Advance Directives:   Code Status: Full 2021 Derek Miller y: Yes  Agent: Jael Clock Number: [phone]    Copy present: No     In paper Chart: No    Scanned into EMR No    Financial  Payor: June Melton / Plan: [employer] PPO / Product Type: *No Product type* /     Pre-cert required for SNF: No    Pharmacy    [address] [address]  Phone: [phone] Fax: [phone]      Potential assistance Purchasing Medications:    Does Patient want to participate in local refill/ meds to beds program?:      Meds To Beds General Rules:  1. Can ONLY be done Monday- Friday between 8:30am-5pm  2. Prescription(s) must be in pharmacy by 3pm to be filled same day  3. Copy of patient's insurance/ prescription drug card and patient face sheet must be sent along with the prescription(s)  4. Cost of Rx cannot be added to hospital bill. If financial assistance is needed, please contact unit  or ;  or  CANNOT provide pharmacy voucher for patients co-pays  5. Patients can then  the prescription on their way out of the hospital at discharge, or pharmacy can deliver to the bedside if staff is available. (payment due at time of pick-up or delivery - cash, check, or card accepted)     Able to afford home medications/ co-pay costs: Yes    ADLS  Support Systems:      PT AM-PAC:   /24  OT AM-PAC:   /24    [place]: single story  Steps: no    Plans to RETURN to current housing: Yes  Barriers to RETURNING to current housing: none    Home Care Information  Currently ACTIVE with [facility]: No  Home Care Agency: Not Applicable          Durable Medical Equipment  DME Provider: n/a   Equipment: n/a    Home Oxygen and [address] prior to admission: Yes  [address]: [id number]  Phone: [phone]   Other Respiratory Equipment:     Informed of need to bring portable home O2 tank on day of DISCHARGE for nursing to connect prior to leaving: Yes  Verbalized agreement/Understanding: Yes  Person to bring portable tank at discharge: Delmi Metzger     Dialysis  Active with HD/PD prior to admission: No  Nephrologist:     HD Center:  Not Applicable    DISCHARGE PLAN:  Disposition: Home with [facility]: tbd     Transportation PLAN for discharge: family     Factors facilitating achievement of predicted outcomes: Family support    Barriers to discharge: Medical complications    Additional Case Management Notes:   Patient is from home alone, has [facility] in place for OT and RN, has [employer] for home O2. Delmi Metzger has pt's portable tank and will be able to transport him home at discharge.       The Plan for Transition of Care is related to the following treatment goals of GI bleed [K92.2]    The Patient and/or patient representative June Drew and his family were provided with a choice of provider and agrees with the discharge plan Yes    Freedom of choice list was provided with basic dialogue that supports the patient's individualized plan of care/goals and shares the quality data associated with the providers.  Yes    Care Transition patient: No    Kevin Turk RN  [facility]  Case Management Department  Ph: [phone]   Fax: [phone]

## 2022-06-16 NOTE — PROGRESS NOTES
4 Eyes Admission Assessment     I agree as the admission nurse that 2 RN's have performed a thorough Head to Toe Skin Assessment on the patient. ALL assessment sites listed below have been assessed on admission. Areas assessed by both nurses:   [x]   Head, Face, and Ears   [x]   Shoulders, Back, and Chest  [x]   Arms, Elbows, and Hands   [x]   Coccyx, Sacrum, and Ischium  [x]   Legs, Feet, and Heels    -SUPERFICIAL ABRASION TO LEFT POSTERIOR THIGH  -SCAB TO RIGHT HEEL      Does the Patient have Skin Breakdown?   No         Dillon Prevention initiated:  No   Wound Care Orders initiated:  No      [facility] nurse consulted for Pressure Injury (Stage 3,4, Unstageable, DTI, NWPT, and Complex wounds) or Dillon score 18 or lower:  No      Nurse 1 eSignature: Electronically signed by Xiomy Harrison RN on 6/16/22 at 5:39 AM EDT    **SHARE this note so that the co-signing nurse is able to place an eSignature**    Nurse 2 eSignature: Electronically signed by Chiquita Concepcion RN on 6/16/22 at 5:41 AM EDT

## 2022-06-16 NOTE — PROGRESS NOTES
Clinical Pharmacy Progress Note  Medication History     Admit Date: 6/16/2022    List of of current medications patient is taking is complete. Home Medication list in EPIC updated to reflect changes noted below. Source of information: list provided by patient's family    Patient's home pharmacy: [address] [place] [address]     Changes made to medication list:   Medications added:    Add medications listed below    Current Outpatient Medications   Medication Instructions    albuterol sulfate HFA (PROVENTIL;VENTOLIN;PROAIR) 108 (90 Base) MCG/ACT inhaler 1-2 puffs, Inhalation, EVERY 6 HOURS PRN    dilTIAZem (TIAZAC) 120 mg, Oral, 2 TIMES DAILY    Eliquis 5 mg, Oral, 2 times daily    Euthyrox 50 mcg, Oral, DAILY    ferrous sulfate (FE TABS 325) 325 mg, Oral, 2 times daily    fluocinolone 0.01 % cream Topical, 2 TIMES DAILY    fluticasone propionate (FLOVENT DISKUS) 100 MCG/BLIST AEPB inhaler 1 puff, Inhalation, 2 TIMES DAILY    furosemide (LASIX) 20 mg, Oral, 2 times daily    metoprolol succinate (TOPROL XL) 100 mg, Oral, DAILY    omeprazole (PRILOSEC) 40 mg, Oral, DAILY    SPIRIVA RESPIMAT 2.5 MCG/ACT AERS inhaler INHALE 2 PUFFS BY MOUTH ONCE DAILY AT THE SAME TIME EVERY DAY    tamsulosin (FLOMAX) 0.4 MG capsule TAKE 1 CAPSULE BY MOUTH ONCE DAILY 30 MINUTES FOLLOWING THE SAME MEAL EVERYDAY       Please call with any questions.   Alfonso Matthews, PharmD, BCPS  Wireless: [phone]   6/16/2022 8:54 AM

## 2022-06-16 NOTE — PROGRESS NOTES
Pt arrives to unit via stretcher with transport medics at bedside at approx. 0155. Report taken by phone by this RN from traserring facility. HUC and charge RN made aware, registration also notified. This RN assumes care at that time. Cardizem and protonix gtts from previous facility removed by medics . Placed pt on cardiac monitor showing Afib RVR rate 120-140, asyptomactic. See flowsheet for complete Vs and assessment. PIVx3 noted. Attending made aware of pt arrival. Awaiting orders at this time.      Electronically signed by Anjana Freedman RN on 6/16/2022 at 2:34 AM

## 2022-06-16 NOTE — RT PROTOCOL NOTE
RT Nebulizer Bronchodilator Protocol Note    There is a bronchodilator order in the chart from a provider indicating to follow the RT Bronchodilator Protocol and there is an Initiate RT Bronchodilator Protocol order as well (see protocol at bottom of note). CXR Findings:  No results found. The findings from the last RT Protocol Assessment were as follows:  Smoking: Chronic pulmonary disease  Respiratory Pattern: Regular pattern and RR 12-20 bpm  Breath Sounds: Clear breath sounds  Cough: Strong, spontaneous, non-productive  Indication for Bronchodilator Therapy: On home bronchodilators  Bronchodilator Assessment Score: 2    Aerosolized bronchodilator medication orders have been revised according to the RT Nebulizer Bronchodilator Protocol below. Respiratory Therapist to perform RT Therapy Protocol Assessment initially then follow the protocol. Repeat RT Therapy Protocol Assessment PRN for score 0-3 or on second treatment, BID, and PRN for scores above 3. No Indications - adjust the frequency to every 6 hours PRN wheezing or bronchospasm, if no treatments needed after 48 hours then discontinue using Per Protocol order mode. If indication present, adjust the RT bronchodilator orders based on the Bronchodilator Assessment Score as indicated below. If a patient is on this medication at home then do not decrease Frequency below that used at home. 0-3 - enter or revise RT bronchodilator order(s) to equivalent RT Bronchodilator order with Frequency of every 4 hours PRN for wheezing or increased work of breathing using Per Protocol order mode. 4-6 - enter or revise RT Bronchodilator order(s) to two equivalent RT bronchodilator orders with one order with BID Frequency and one order with Frequency of every 4 hours PRN wheezing or increased work of breathing using Per Protocol order mode.          7-10 - enter or revise RT Bronchodilator order(s) to two equivalent RT bronchodilator orders with one order with TID Frequency and one order with Frequency of every 4 hours PRN wheezing or increased work of breathing using Per Protocol order mode. 11-13 - enter or revise RT Bronchodilator order(s) to one equivalent RT bronchodilator order with QID Frequency and an Albuterol order with Frequency of every 4 hours PRN wheezing or increased work of breathing using Per Protocol order mode. Greater than 13 - enter or revise RT Bronchodilator order(s) to one equivalent RT bronchodilator order with every 4 hours Frequency and an Albuterol order with Frequency of every 2 hours PRN wheezing or increased work of breathing using Per Protocol order mode. RT to enter RT Home Evaluation for COPD & MDI Assessment order using Per Protocol order mode.     Electronically signed by Eduardo Franklin RCP on 6/16/2022 at 4:14 AM

## 2022-06-16 NOTE — PLAN OF CARE
Problem: Discharge Planning  Goal: Discharge to home or other facility with appropriate resources  Outcome: Progressing     Problem: Skin/Tissue Integrity  Goal: Absence of new skin breakdown  Description: 1. Monitor for areas of redness and/or skin breakdown  2. Assess vascular access sites hourly  3. Every 4-6 hours minimum:  Change oxygen saturation probe site  4. Every 4-6 hours:  If on nasal continuous positive airway pressure, respiratory therapy assess nares and determine need for appliance change or resting period. Outcome: Progressing     Problem: Safety - Adult  Goal: Free from fall injury  Outcome: Progressing     Skin integrity maintained by frequent repositioning. Incontinence care provided as needed. Minimum layers utilized under pt to reduce friction/shearing and pressure injury. High fall precautions in place including sign, arm band, bed locked in low position, and bed alarm engaged. Call light within reach. Pt instructed to use call light and not to get OOB alone.

## 2022-06-16 NOTE — H&P
Pre-operative History and Physical    Patient: Milan Mcrae. : 1935     History Obtained From:  Patient, son, and EHR    HISTORY OF PRESENT ILLNESS:    The patient is a 81 y.o. male who presents for an EGD for GIB and PUD found on EGD in 2022. Past Medical History:        Diagnosis Date    Acquired hypothyroidism     Aortic stenosis     Atrial fibrillation (HCC)     BPH (benign prostatic hyperplasia)     Chronic diastolic (congestive) heart failure (HCC)     COPD (chronic obstructive pulmonary disease) (HCC)     Essential hypertension     Gastric ulcer     per EGD at [facility] 2022     Past Surgical History:    History reviewed. No pertinent surgical history. Medications Prior to Admission:   No current facility-administered medications on file prior to encounter.      Current Outpatient Medications on File Prior to Encounter   Medication Sig Dispense Refill    SPIRIVA RESPIMAT 2.5 MCG/ACT AERS inhaler INHALE 2 PUFFS BY MOUTH ONCE DAILY AT THE SAME TIME EVERY DAY      albuterol sulfate HFA (PROVENTIL;VENTOLIN;PROAIR) 108 (90 Base) MCG/ACT inhaler Inhale 1-2 puffs into the lungs every 6 hours as needed      ferrous sulfate (FE TABS 325) 325 (65 Fe) MG EC tablet Take 325 mg by mouth in the morning and at bedtime      tamsulosin (FLOMAX) 0.4 MG capsule TAKE 1 CAPSULE BY MOUTH ONCE DAILY 30 MINUTES FOLLOWING THE SAME MEAL EVERYDAY      omeprazole (PRILOSEC) 40 MG delayed release capsule Take 40 mg by mouth daily      metoprolol succinate (TOPROL XL) 100 MG extended release tablet Take 100 mg by mouth daily      EUTHYROX 50 MCG tablet Take 50 mcg by mouth daily      furosemide (LASIX) 20 MG tablet Take 20 mg by mouth in the morning and at bedtime      fluticasone propionate (FLOVENT DISKUS) 100 MCG/BLIST AEPB inhaler Inhale 1 puff into the lungs 2 times daily      dilTIAZem (TIAZAC) 120 MG extended release capsule Take 120 mg by mouth 2 times daily      ELIQUIS 5 MG TABS tablet Take 5 mg by mouth in the morning and at bedtime      fluocinolone 0.01 % cream Apply topically 2 times daily       Allergies:  Patient has no known allergies. History of allergic reaction to anesthesia:  No    Social History:   Smoker with COPD on 2LNC  Denies EtOH    Family History:   History reviewed. No pertinent family history. PHYSICAL EXAM:      BP 97/69   Pulse 90   Temp 97.4 °F (36.3 °C) (Oral)   Resp 22   Ht 5' 7\" (1.702 m)   Wt 145 lb 8.1 oz (66 kg)   SpO2 97%   BMI 22.79 kg/m²  I        Heart:  No m/r/g +s1/s2 RRR    Lungs:  CTA bilaterally    Abdomen:  Soft, nontender, non distended; +bs    ASA Grade:  ASA 4 - Patient with severe systemic disease that is a constant threat to life    Mallampati Class:  Class I: Soft palate, uvula, fauces, pillars visible  __________  Class II: Soft palate, uvula, fauces visible  ____X______   Class III: Soft palate, base of uvula visible  __________  Class IV: Hard palate only visible   __________      ASSESSMENT AND PLAN:    1. Patient is a 81 y.o. male here for EGD with anesthesia  2. Procedure options, risks and benefits reviewed with patient. We specifically discussed that risks include, but are not limited to infection, bleeding, perforation, death, and missed lesions. Patient expresses understanding.

## 2022-06-17 ENCOUNTER — APPOINTMENT (OUTPATIENT)
Dept: CT IMAGING | Age: 87
DRG: 377 | End: 2022-06-17
Attending: INTERNAL MEDICINE
Payer: MEDICARE

## 2022-06-17 ENCOUNTER — APPOINTMENT (OUTPATIENT)
Dept: GENERAL RADIOLOGY | Age: 87
DRG: 377 | End: 2022-06-17
Attending: INTERNAL MEDICINE
Payer: MEDICARE

## 2022-06-17 LAB
ABO/RH: NORMAL
ANION GAP SERPL CALCULATED.3IONS-SCNC: 6 MMOL/L (ref 3–16)
ANTIBODY SCREEN: NORMAL
BASOPHILS ABSOLUTE: 0 K/UL (ref 0–0.2)
BASOPHILS RELATIVE PERCENT: 0.4 %
BUN BLDV-MCNC: 22 MG/DL (ref 7–20)
CALCIUM SERPL-MCNC: 8.3 MG/DL (ref 8.3–10.6)
CHLORIDE BLD-SCNC: 106 MMOL/L (ref 99–110)
CO2: 27 MMOL/L (ref 21–32)
CREAT SERPL-MCNC: 1.1 MG/DL (ref 0.8–1.3)
EOSINOPHILS ABSOLUTE: 0.3 K/UL (ref 0–0.6)
EOSINOPHILS RELATIVE PERCENT: 4.8 %
GFR AFRICAN AMERICAN: >60
GFR NON-AFRICAN AMERICAN: >60
GLUCOSE BLD-MCNC: 102 MG/DL (ref 70–99)
HCT VFR BLD CALC: 21.7 % (ref 40.5–52.5)
HCT VFR BLD CALC: 23.2 % (ref 40.5–52.5)
HCT VFR BLD CALC: 23.4 % (ref 40.5–52.5)
HCT VFR BLD CALC: 23.5 % (ref 40.5–52.5)
HEMOGLOBIN: 7 G/DL (ref 13.5–17.5)
HEMOGLOBIN: 7.6 G/DL (ref 13.5–17.5)
HEMOGLOBIN: 7.6 G/DL (ref 13.5–17.5)
HEMOGLOBIN: 7.7 G/DL (ref 13.5–17.5)
LACTIC ACID: 1 MMOL/L (ref 0.4–2)
LYMPHOCYTES ABSOLUTE: 0.7 K/UL (ref 1–5.1)
LYMPHOCYTES RELATIVE PERCENT: 10.6 %
MCH RBC QN AUTO: 28.3 PG (ref 26–34)
MCHC RBC AUTO-ENTMCNC: 32.2 G/DL (ref 31–36)
MCV RBC AUTO: 87.7 FL (ref 80–100)
MONOCYTES ABSOLUTE: 0.5 K/UL (ref 0–1.3)
MONOCYTES RELATIVE PERCENT: 7.7 %
NEUTROPHILS ABSOLUTE: 5.2 K/UL (ref 1.7–7.7)
NEUTROPHILS RELATIVE PERCENT: 76.5 %
PDW BLD-RTO: 17.8 % (ref 12.4–15.4)
PLATELET # BLD: 241 K/UL (ref 135–450)
PMV BLD AUTO: 7 FL (ref 5–10.5)
POTASSIUM REFLEX MAGNESIUM: 4.7 MMOL/L (ref 3.5–5.1)
PRO-BNP: 2001 PG/ML (ref 0–449)
PROCALCITONIN: 0.06 NG/ML (ref 0–0.15)
RBC # BLD: 2.48 M/UL (ref 4.2–5.9)
SODIUM BLD-SCNC: 139 MMOL/L (ref 136–145)
WBC # BLD: 6.7 K/UL (ref 4–11)

## 2022-06-17 PROCEDURE — 85025 COMPLETE CBC W/AUTO DIFF WBC: CPT

## 2022-06-17 PROCEDURE — 36415 COLL VENOUS BLD VENIPUNCTURE: CPT

## 2022-06-17 PROCEDURE — 86850 RBC ANTIBODY SCREEN: CPT

## 2022-06-17 PROCEDURE — C9113 INJ PANTOPRAZOLE SODIUM, VIA: HCPCS | Performed by: INTERNAL MEDICINE

## 2022-06-17 PROCEDURE — 94761 N-INVAS EAR/PLS OXIMETRY MLT: CPT

## 2022-06-17 PROCEDURE — 83880 ASSAY OF NATRIURETIC PEPTIDE: CPT

## 2022-06-17 PROCEDURE — 6360000002 HC RX W HCPCS: Performed by: INTERNAL MEDICINE

## 2022-06-17 PROCEDURE — 6370000000 HC RX 637 (ALT 250 FOR IP): Performed by: NURSE PRACTITIONER

## 2022-06-17 PROCEDURE — 74177 CT ABD & PELVIS W/CONTRAST: CPT

## 2022-06-17 PROCEDURE — 85018 HEMOGLOBIN: CPT

## 2022-06-17 PROCEDURE — 99233 SBSQ HOSP IP/OBS HIGH 50: CPT | Performed by: NURSE PRACTITIONER

## 2022-06-17 PROCEDURE — 86901 BLOOD TYPING SEROLOGIC RH(D): CPT

## 2022-06-17 PROCEDURE — 80048 BASIC METABOLIC PNL TOTAL CA: CPT

## 2022-06-17 PROCEDURE — 2700000000 HC OXYGEN THERAPY PER DAY

## 2022-06-17 PROCEDURE — 86923 COMPATIBILITY TEST ELECTRIC: CPT

## 2022-06-17 PROCEDURE — 6370000000 HC RX 637 (ALT 250 FOR IP): Performed by: INTERNAL MEDICINE

## 2022-06-17 PROCEDURE — 83605 ASSAY OF LACTIC ACID: CPT

## 2022-06-17 PROCEDURE — 84145 PROCALCITONIN (PCT): CPT

## 2022-06-17 PROCEDURE — 94640 AIRWAY INHALATION TREATMENT: CPT

## 2022-06-17 PROCEDURE — 1200000000 HC SEMI PRIVATE

## 2022-06-17 PROCEDURE — 2580000003 HC RX 258: Performed by: INTERNAL MEDICINE

## 2022-06-17 PROCEDURE — 71045 X-RAY EXAM CHEST 1 VIEW: CPT

## 2022-06-17 PROCEDURE — 86900 BLOOD TYPING SEROLOGIC ABO: CPT

## 2022-06-17 PROCEDURE — 6360000004 HC RX CONTRAST MEDICATION: Performed by: INTERNAL MEDICINE

## 2022-06-17 PROCEDURE — 85014 HEMATOCRIT: CPT

## 2022-06-17 RX ORDER — FUROSEMIDE 20 MG/1
20 TABLET ORAL 2 TIMES DAILY
Status: DISCONTINUED | OUTPATIENT
Start: 2022-06-17 | End: 2022-06-19

## 2022-06-17 RX ORDER — SODIUM CHLORIDE 9 MG/ML
INJECTION, SOLUTION INTRAVENOUS PRN
Status: DISCONTINUED | OUTPATIENT
Start: 2022-06-17 | End: 2022-06-17

## 2022-06-17 RX ORDER — DICYCLOMINE HYDROCHLORIDE 10 MG/1
10 CAPSULE ORAL 3 TIMES DAILY PRN
Status: DISCONTINUED | OUTPATIENT
Start: 2022-06-17 | End: 2022-06-20 | Stop reason: HOSPADM

## 2022-06-17 RX ORDER — MORPHINE SULFATE 2 MG/ML
1 INJECTION, SOLUTION INTRAMUSCULAR; INTRAVENOUS EVERY 4 HOURS PRN
Status: DISCONTINUED | OUTPATIENT
Start: 2022-06-17 | End: 2022-06-20 | Stop reason: HOSPADM

## 2022-06-17 RX ORDER — IPRATROPIUM BROMIDE AND ALBUTEROL SULFATE 2.5; .5 MG/3ML; MG/3ML
1 SOLUTION RESPIRATORY (INHALATION) EVERY 4 HOURS PRN
Status: DISCONTINUED | OUTPATIENT
Start: 2022-06-17 | End: 2022-06-20 | Stop reason: HOSPADM

## 2022-06-17 RX ORDER — IPRATROPIUM BROMIDE AND ALBUTEROL SULFATE 2.5; .5 MG/3ML; MG/3ML
1 SOLUTION RESPIRATORY (INHALATION)
Status: DISCONTINUED | OUTPATIENT
Start: 2022-06-17 | End: 2022-06-17

## 2022-06-17 RX ORDER — FUROSEMIDE 10 MG/ML
20 INJECTION INTRAMUSCULAR; INTRAVENOUS ONCE
Status: COMPLETED | OUTPATIENT
Start: 2022-06-17 | End: 2022-06-17

## 2022-06-17 RX ADMIN — SODIUM CHLORIDE, PRESERVATIVE FREE 10 ML: 5 INJECTION INTRAVENOUS at 21:31

## 2022-06-17 RX ADMIN — IOPAMIDOL 75 ML: 755 INJECTION, SOLUTION INTRAVENOUS at 10:37

## 2022-06-17 RX ADMIN — DILTIAZEM HYDROCHLORIDE 60 MG: 60 TABLET, FILM COATED ORAL at 17:35

## 2022-06-17 RX ADMIN — DILTIAZEM HYDROCHLORIDE 60 MG: 60 TABLET, FILM COATED ORAL at 00:48

## 2022-06-17 RX ADMIN — FUROSEMIDE 20 MG: 10 INJECTION, SOLUTION INTRAMUSCULAR; INTRAVENOUS at 11:44

## 2022-06-17 RX ADMIN — TAMSULOSIN HYDROCHLORIDE 0.4 MG: 0.4 CAPSULE ORAL at 11:44

## 2022-06-17 RX ADMIN — FUROSEMIDE 20 MG: 20 TABLET ORAL at 17:35

## 2022-06-17 RX ADMIN — LEVOTHYROXINE SODIUM 50 MCG: 50 TABLET ORAL at 06:10

## 2022-06-17 RX ADMIN — DILTIAZEM HYDROCHLORIDE 60 MG: 60 TABLET, FILM COATED ORAL at 11:45

## 2022-06-17 RX ADMIN — MORPHINE SULFATE 1 MG: 2 INJECTION, SOLUTION INTRAMUSCULAR; INTRAVENOUS at 09:02

## 2022-06-17 RX ADMIN — DILTIAZEM HYDROCHLORIDE 60 MG: 60 TABLET, FILM COATED ORAL at 06:10

## 2022-06-17 RX ADMIN — ALBUTEROL SULFATE 2.5 MG: 2.5 SOLUTION RESPIRATORY (INHALATION) at 08:50

## 2022-06-17 RX ADMIN — DILTIAZEM HYDROCHLORIDE 60 MG: 60 TABLET, FILM COATED ORAL at 23:32

## 2022-06-17 RX ADMIN — PANTOPRAZOLE SODIUM 40 MG: 40 INJECTION, POWDER, FOR SOLUTION INTRAVENOUS at 21:30

## 2022-06-17 RX ADMIN — METOPROLOL SUCCINATE 100 MG: 100 TABLET, EXTENDED RELEASE ORAL at 11:44

## 2022-06-17 RX ADMIN — SODIUM CHLORIDE, PRESERVATIVE FREE 10 ML: 5 INJECTION INTRAVENOUS at 09:08

## 2022-06-17 RX ADMIN — SODIUM CHLORIDE: 9 INJECTION, SOLUTION INTRAVENOUS at 03:53

## 2022-06-17 RX ADMIN — DICYCLOMINE HYDROCHLORIDE 10 MG: 10 CAPSULE ORAL at 16:42

## 2022-06-17 RX ADMIN — PANTOPRAZOLE SODIUM 40 MG: 40 INJECTION, POWDER, FOR SOLUTION INTRAVENOUS at 09:02

## 2022-06-17 RX ADMIN — MORPHINE SULFATE 1 MG: 2 INJECTION, SOLUTION INTRAMUSCULAR; INTRAVENOUS at 16:02

## 2022-06-17 RX ADMIN — DICYCLOMINE HYDROCHLORIDE 10 MG: 10 CAPSULE ORAL at 21:31

## 2022-06-17 ASSESSMENT — PAIN SCALES - GENERAL
PAINLEVEL_OUTOF10: 0
PAINLEVEL_OUTOF10: 10
PAINLEVEL_OUTOF10: 0
PAINLEVEL_OUTOF10: 4
PAINLEVEL_OUTOF10: 10
PAINLEVEL_OUTOF10: 0

## 2022-06-17 ASSESSMENT — PAIN DESCRIPTION - LOCATION
LOCATION: ABDOMEN

## 2022-06-17 ASSESSMENT — PAIN DESCRIPTION - DESCRIPTORS
DESCRIPTORS: ACHING
DESCRIPTORS: ACHING

## 2022-06-17 ASSESSMENT — PAIN DESCRIPTION - ORIENTATION
ORIENTATION: MID
ORIENTATION: MID

## 2022-06-17 ASSESSMENT — PAIN DESCRIPTION - PAIN TYPE: TYPE: ACUTE PAIN

## 2022-06-17 ASSESSMENT — PAIN - FUNCTIONAL ASSESSMENT: PAIN_FUNCTIONAL_ASSESSMENT: ACTIVITIES ARE NOT PREVENTED

## 2022-06-17 ASSESSMENT — PAIN DESCRIPTION - ONSET: ONSET: ON-GOING

## 2022-06-17 NOTE — PROGRESS NOTES
HOSPITALISTS PROGRESS NOTE    6/17/2022 1:53 PM        Name: Francisco Chávez .              Admitted: 6/16/2022  Primary Care Provider: No primary care provider on file. (Tel: None)      Problem List  Principal Problem:    GI bleed  Active Problems:    Acquired hypothyroidism    Aortic stenosis    BPH (benign prostatic hyperplasia)    COPD (chronic obstructive pulmonary disease) (HCC)    Essential hypertension    Chronic diastolic (congestive) heart failure (HCC)    Atrial fibrillation with rapid ventricular response (HCC)    CAD (coronary artery disease)  Resolved Problems:    * No resolved hospital problems. *       Assessment & Plan:   Respiratory distress with audible wheezing probably related to fluid overload  IV fluid, IV Lasix with good response, chest x-ray reviewed,  Also ordered given history of COPD    Abdominal pain  Etiology unclear, as needed morphine, CT abdomen with contrast done, GI is on consult    Acute blood loss anemia related to GI bleeding  S/p EGD, did a recent colonoscopy, on clear liquid food, advance diet as per GI, monitor serial H&H    History of severe AS patient has been scheduled for TAVR  Bilateral pleural effusion related to chronic atelectasis on review of recent hospitalization at [facility]    IV Access: Peripheral  Zamora: No  Diet: ADULT DIET; Clear Liquid  ADULT ORAL NUTRITION SUPPLEMENT; Breakfast, Lunch, Dinner; Clear Liquid Oral Supplement  Code:Full Code  DVT PPX SCDs  Disposition monitor in the hospital          CC: Abdominal pain and shortness of breath    Subjective:  .   Abdominal pain and shortness of breath   Did had a 1 bloody bowel movement overnight  Patient appears in visible respiratory distress  Complains of severe abdominal pain going on since 3:00    Reviewed interval ancillary notes    Current Medications  0.9 % sodium chloride infusion, PRN  morphine (PF) injection 1 mg, Q4H PRN  ipratropium-albuterol (DUONEB) nebulizer solution 1 ampule, Q4H PRN  sodium chloride flush 0.9 % injection 10 mL, 2 times per day  sodium chloride flush 0.9 % injection 10 mL, PRN  0.9 % sodium chloride infusion, PRN  ondansetron (ZOFRAN) injection 4 mg, Q4H PRN  polyethylene glycol (GLYCOLAX) packet 17 g, Daily PRN  acetaminophen (TYLENOL) tablet 650 mg, Q4H PRN   Or  acetaminophen (TYLENOL) suppository 650 mg, Q4H PRN  levothyroxine (SYNTHROID) tablet 50 mcg, Daily  tamsulosin (FLOMAX) capsule 0.4 mg, Daily  metoprolol succinate (TOPROL XL) extended release tablet 100 mg, Daily  albuterol (PROVENTIL) nebulizer solution 2.5 mg, Q6H PRN  pantoprazole (PROTONIX) injection 40 mg, BID  dilTIAZem (CARDIZEM) tablet 60 mg, 4 times per day  calcium carbonate (TUMS) chewable tablet 500 mg, TID PRN        Objective:  /86   Pulse 94   Temp 97.6 °F (36.4 °C) (Oral)   Resp 27   Ht 5' 7\" (1.702 m)   Wt 146 lb 6.2 oz (66.4 kg)   SpO2 97%   BMI 22.93 kg/m²     Intake/Output Summary (Last 24 hours) at 6/17/2022 1353  Last data filed at 6/17/2022 1230  Gross per 24 hour   Intake 1694.82 ml   Output 575 ml   Net 1119.82 ml      Wt Readings from Last 3 Encounters:   06/17/22 146 lb 6.2 oz (66.4 kg)   Audible wheezing  Diminished breath sounds with minimal fine crackles on posterior lung bases  Diffuse abdominal tenderness  Moderate distress  Eyes: Sclera clear. Pupils equal.  ENT: Moist oral mucosa. Trachea midline, no adenopathy. Cardiovascular: Regular rhythm, normal S1, S2. Systolic murmur. No edema in lower extremities  Respiratory:  using accessory muscles. Good inspiratory effort. Musculoskeletal: No cyanosis in digits, neck supple  Neurology: CN 2-12 grossly intact. No speech or motor deficits  Psych: Normal affect. Alert and oriented in time, place and person  Skin: Warm, dry, normal turgor  Extremity exam shows brisk capillary refill.   Peripheral pulses are palpable in lower extremities     Labs and Tests:  CBC:   Recent Labs     06/16/22  0509 06/16/22  0824 06/16/22  1906 06/17/22  0306 06/17/22  0845   WBC 7.8  --   --  6.7  --    HGB 8.1*   < > 7.7* 7.0* 7.6*     --   --  241  --     < > = values in this interval not displayed. BMP:    Recent Labs     06/16/22  0509 06/17/22  0306    139   K 4.5 4.7    106   CO2 27 27   BUN 28* 22*   CREATININE 1.1 1.1   GLUCOSE 109* 102*     Hepatic:   Recent Labs     06/16/22  0509   AST 13*   ALT 6*   BILITOT 0.4   ALKPHOS 51     CT ABDOMEN PELVIS W IV CONTRAST Additional Contrast? None   Final Result   1. Compression deformities of the L1 and L4 vertebral bodies, age indeterminate. 2. Moderate right and small left pleural effusions. 3. Colonic diverticulosis without diverticulitis. XR CHEST PORTABLE   Final Result      1. Or multifocal airspace disease concerning for multifocal pneumonia. 2.  Bilateral pleural effusions.              Discussed care with family    Alyson Pineda MD   6/17/2022 1:53 PM

## 2022-06-17 NOTE — PLAN OF CARE
Problem: Discharge Planning  Goal: Discharge to home or other facility with appropriate resources  6/17/2022 0121 by Papa Bradley RN  Outcome: Progressing     Problem: Skin/Tissue Integrity  Goal: Absence of new skin breakdown  Description: 1. Monitor for areas of redness and/or skin breakdown  2. Assess vascular access sites hourly  3. Every 4-6 hours minimum:  Change oxygen saturation probe site  4. Every 4-6 hours:  If on nasal continuous positive airway pressure, respiratory therapy assess nares and determine need for appliance change or resting period.   6/17/2022 0121 by Papa Bradley RN  Outcome: Progressing     Problem: Safety - Adult  Goal: Free from fall injury  6/17/2022 0121 by Papa Bradley RN  Outcome: Progressing     Problem: Pain  Goal: Verbalizes/displays adequate comfort level or baseline comfort level  6/17/2022 0121 by Papa Bradley RN  Outcome: Progressing     Problem: Chronic Conditions and Co-morbidities  Goal: Patient's chronic conditions and co-morbidity symptoms are monitored and maintained or improved  6/17/2022 0121 by Papa Bradley RN  Outcome: Progressing     Problem: Cardiovascular - Adult  Goal: Maintains optimal cardiac output and hemodynamic stability  6/17/2022 0121 by Papa Bradley RN  Outcome: Progressing     Problem: Cardiovascular - Adult  Goal: Absence of cardiac dysrhythmias or at baseline  Outcome: Progressing     Problem: Gastrointestinal - Adult  Goal: Maintains or returns to baseline bowel function  Outcome: Progressing     Problem: Hematologic - Adult  Goal: Maintains hematologic stability  6/17/2022 0121 by Papa Bradley RN  Outcome: Progressing

## 2022-06-17 NOTE — PROGRESS NOTES
Electrophysiology - PROGRESS NOTE    Admit Date: 6/16/2022     Chief Complaint: AF/RVR     Interval History:   Patient seen and examined and notes reviewed. Patient is being followed for AF/RVR. Pt p/w concerns of BRBPR, abdominal pain, concern for acute blood loss anemia and PRBC transfused. Found to be in AF/RVR, placed on dilt gtt. Underwent EGD yesterday showing no evidence of active bleed. Remains on IV Protonix. Dilt gtt switched to po dilt yesterday afternoon. Rates fairly well controlled. Continues to c/o worsening abd pain.  CT ABD and PRBC ordered by primary team.    In: 1794.8 [I.V.:1794.8]  Out: -    Wt Readings from Last 2 Encounters:   06/17/22 146 lb 6.2 oz (66.4 kg)         Data:   Scheduled Meds:   Scheduled Meds:   sodium chloride flush  10 mL IntraVENous 2 times per day    levothyroxine  50 mcg Oral Daily    tamsulosin  0.4 mg Oral Daily    metoprolol succinate  100 mg Oral Daily    pantoprazole  40 mg IntraVENous BID    dilTIAZem  60 mg Oral 4 times per day     Continuous Infusions:   sodium chloride      sodium chloride      sodium chloride 75 mL/hr at 06/17/22 0353     PRN Meds:.sodium chloride, sodium chloride flush, sodium chloride, ondansetron, polyethylene glycol, acetaminophen **OR** acetaminophen, albuterol, calcium carbonate  Continuous Infusions:   sodium chloride      sodium chloride      sodium chloride 75 mL/hr at 06/17/22 0353       Intake/Output Summary (Last 24 hours) at 6/17/2022 0829  Last data filed at 6/17/2022 0300  Gross per 24 hour   Intake 1794.82 ml   Output --   Net 1794.82 ml       CBC:   Lab Results   Component Value Date    WBC 6.7 06/17/2022    HGB 7.0 06/17/2022     06/17/2022     BMP:  Lab Results   Component Value Date     06/17/2022    K 4.7 06/17/2022     06/17/2022    CO2 27 06/17/2022    BUN 22 06/17/2022    CREATININE 1.1 06/17/2022    GLUCOSE 102 06/17/2022     INR: No results found for: INR     CARDIAC LABS  ENZYMES:No results for input(s): CKMB, CKMBINDEX, TROPONINI in the last 72 hours. Invalid input(s): CKTOTAL;3  FASTING LIPID PANEL:No results found for: HDL, LDLDIRECT, LDLCALC, TRIG, TSH  LIVER PROFILE:  Lab Results   Component Value Date    AST 13 06/16/2022    ALT 6 06/16/2022       -----------------------------------------------------------------  Telemetry: Personally reviewed  AF, rates fairly well controlled    Objective:   Vitals: /70   Pulse 84   Temp 97.9 °F (36.6 °C) (Oral)   Resp 24   Ht 5' 7\" (1.702 m)   Wt 146 lb 6.2 oz (66.4 kg)   SpO2 97%   BMI 22.93 kg/m²   General appearance: alert, appears stated age and cooperative, No acute distress   Skin: Skin color, texture, turgor normal. No rashes or ecchymosis. Neck: no JVD, supple, symmetrical, trachea midline   Lungs: , no accessory muscle use, no respiratory distress  Heart: irregularly irregular, tachy at times  Extremities: No edema, DP +  Psychiatric: normal insight and affect    Patient Active Problem List:     GI bleed     Acquired hypothyroidism     Aortic stenosis     Atrial fibrillation (HCC)     BPH (benign prostatic hyperplasia)     COPD (chronic obstructive pulmonary disease) (HCC)     Essential hypertension     Chronic diastolic (congestive) heart failure (HCC)     Gastric ulcer     Atrial fibrillation with rapid ventricular response (HCC)     CAD (coronary artery disease)        Assessment & Plan:    1. GIB  2. AF/RVR  3.  Severe AS    Patsy Clonts. is a 81 y.o. man w/ PMH HTN, hypothyroidism, CAD, COPD, severe AS with planned TAVR 9/3447, chronic diastolic HF, persistent AF, GIB who p/w BRBPR and abdominal pain, given 2 units PRBC, found to be in AF/RVR, placed on diltiazem gtt     AF/RVR  - In AF, rates ranging from  bpm  - CHADSVASc at least 4  - No OAC d/t bleeding, may need to consider watchman outpt  - On po dilt 60 mg Q6H  - On Toprol 100 mg QD  - Continuous tele  - Keep K >4.0, Mg >2.0  - TSH 7.24, T4 1.3  - Reviewed recent echo from [facility] (5/2022)  - [address] cardiology at [facility]  - ECG ordered and results personally reviewed      Severe AS  - Seen on Echo   - Plan for TAVR at [facility] 7/2022     GIB  - On IV protonix  - asa, eliquis on hold  - EGD showed no evidence of upper GIB  - GI following     EF of 33-89%  No known systolic HF  ASA and Statin for CAD  Anticoagulation for AF  No tobacco use.        Electronically signed by LYNNETTE Rendon CNP on 6/17/22 at 8:29 AM EDT    [address]

## 2022-06-17 NOTE — PROGRESS NOTES
Comprehensive Nutrition Assessment    RECOMMENDATIONS:  1. PO Diet: Continue Clear Liquid Diet as tolerated; Diet advancement per MD  2. ONS: Start Ensure Clear TID while on CLD; Modify to [facility] when diet able to advance    NUTRITION ASSESSMENT:   Nutritional summary & status: Nutrition screening triggered for decreased intake and wt loss. Pt c/o abdominal pain this AM noting admit for GI bleed. Pt currently on clear liquid diet, no PO intakes have been documented yet. Unable to assess for any significant wt changes as no wt hx per chart review. Will send ONS to help promote better PO intakes. Will monitor for diet advancement and plan of care throughout adm.  Admission/PMH: Admit for GI bleed; PMH: hypertension, hypothyroidism, BPH, COPD, coronary artery disease, chronic diastolic congestive heart failure and severe aortic stenosis    MALNUTRITION ASSESSMENT  Context of Malnutrition: Acute Illness   Malnutrition Status:  At risk for malnutrition (Comment)  Findings of the 6 clinical characteristics of malnutrition (Minimum of 2 out of 6 clinical characteristics is required to make the diagnosis of moderate or severe Protein Calorie Malnutrition based on AND/ASPEN Guidelines):  Energy Intake: Less than/equal to 50% of estimated energy requirements    Energy Intake Time: x 2 days   Weight Loss %: Unable to assess    Weight loss Time: Unable to assess     NUTRITION DIAGNOSIS   Inadequate oral intake related to altered GI function as evidenced by other (comment) (abdominal pain, bloody stools)    NUTRITION INTERVENTION  Food and/or Nutrient Delivery:  Continue Current Diet,Start Oral Nutrition Supplement  Nutrition Education/Counseling:  No recommendation at this time   Goals:  pt will tolerate most appropriate form of nutrition to meet >75% energy needs         Nutrition Monitoring and Evaluation:   Food/Nutrient Intake Outcomes:  Diet Advancement/Tolerance,Food and Nutrient Intake,Supplement Intake  Physical Signs/Symptoms Outcomes:  Biochemical Data,Weight,GI Status     OBJECTIVE DATA: Significant to nutrition assessment  · Nutrition-Focused Physical Findings: lbm 6/16~bloody, no edema noted  · Labs: Reviewed;   · Meds: Reviewed; synthroid, protonix  · Wounds: None       CURRENT NUTRITION THERAPIES  ADULT DIET; Clear Liquid     PO Intake: Unable to assess   PO Supplement Intake:None Ordered  Additional Sources of Calories/IVF:NS @ 75ml/hr     ANTHROPOMETRICS  Current Height: 5' 7\" (170.2 cm)  Current Weight: 146 lb 6.2 oz (66.4 kg)    Admission weight: 145 lb 8.1 oz (66 kg)  Ideal Body Weight (IBW): 148 lbs  (67 kg)    Usual Bodyweight   JOSE  Weight Changes: JOSE-no wt hx      BMI: 23    Wt Readings from Last 50 Encounters:   06/17/22 146 lb 6.2 oz (66.4 kg)     COMPARATIVE STANDARDS  Energy (kcal):  6863-2826 (25-30)     Protein (g):  79-92 (1.2-1.4)       Fluid (ml/day):  1431-0685    The patient will still be monitored per nutrition standards of care. Consult dietitian if nutrition interventions essential to patient care is needed.      Romero Mccarthy [address]in [address], [address]  [place]:  [phone]  Office:  [phone]

## 2022-06-17 NOTE — CARE COORDINATION
Patient is from home alone, family very involved. He is active with [facility] for RN and OT per his son, but does not know the agency providing the services, think it could be from [phone] [id number] gave [facility] number for case management in case they need new orders. They plan for patient to return home.      Yessenia Robert, RN, BSN,   4th Floor Progressive Care Unit  [phone]

## 2022-06-17 NOTE — PROGRESS NOTES
GI Progress Note      Herson Adames is a 81 y.o. male patient. No diagnosis found. Admit Date: 2022    Subjective:       Complained of significant abdominal pain this morning and was given morphine. Currently comfortable and denying pain. RN reports no s/s bleeding and no BM. Tolerating clears. ROS:  As per above    Scheduled Meds:   furosemide  20 mg Oral BID    sodium chloride flush  10 mL IntraVENous 2 times per day    levothyroxine  50 mcg Oral Daily    tamsulosin  0.4 mg Oral Daily    metoprolol succinate  100 mg Oral Daily    pantoprazole  40 mg IntraVENous BID    dilTIAZem  60 mg Oral 4 times per day       Continuous Infusions:   sodium chloride         PRN Meds:  morphine, ipratropium-albuterol, sodium chloride flush, sodium chloride, ondansetron, polyethylene glycol, acetaminophen **OR** acetaminophen, albuterol, calcium carbonate      Objective:       Patient Vitals for the past 24 hrs:   BP Temp Temp src Pulse Resp SpO2 Weight   22 1551 106/68 97.7 °F (36.5 °C) Oral 91 22 98 % --   22 1309 -- 97.6 °F (36.4 °C) Oral 94 -- -- --   22 1136 116/86 97.5 °F (36.4 °C) Oral 96 27 97 % --   22 0914 -- -- -- 92 (!) 38 97 % --   22 0830 118/72 97.2 °F (36.2 °C) Oral -- (!) 31 97 % --   22 0547 -- -- -- 84 -- -- --   22 0345 104/70 97.9 °F (36.6 °C) Oral 84 24 97 % 146 lb 6.2 oz (66.4 kg)   22 0023 107/79 97.5 °F (36.4 °C) Oral 89 22 98 % --   22 2205 -- -- -- 86 20 97 % --   22 101/67 97.7 °F (36.5 °C) Oral 78 29 99 % --       Exam:  VITALS:  /68   Pulse 91   Temp 97.7 °F (36.5 °C) (Oral)   Resp 22   Ht 5' 7\" (1.702 m)   Wt 146 lb 6.2 oz (66.4 kg)   SpO2 98%   BMI 22.93 kg/m²   TEMPERATURE:  Current - Temp: 97.7 °F (36.5 °C);  Max - Temp  Av.6 °F (36.4 °C)  Min: 97.2 °F (36.2 °C)  Max: 97.9 °F (36.6 °C)      General appearance: alert, appears stated age, cooperative and no distress  Head: Normocephalic, without obvious abnormality, atraumatic  Neck: supple, symmetrical, trachea midline and thyroid not enlarged, symmetric, no tenderness/mass/nodules  CVS:  RRR, Nl s1s2  Lungs decreased breath sounds at bases  Abdomen: soft, NT  AAOx3, No asterixis or encephalopathy        Recent Labs     06/16/22  0509 06/16/22  0824 06/17/22  0306 06/17/22  0845 06/17/22  1443   WBC 7.8  --  6.7  --   --    HGB 8.1*   < > 7.0* 7.6* 7.6*   HCT 24.3*   < > 21.7* 23.4* 23.5*   MCV 86.6  --  87.7  --   --      --  241  --   --     < > = values in this interval not displayed. Recent Labs     06/16/22  0509 06/17/22  0306    139   K 4.5 4.7    106   CO2 27 27   BUN 28* 22*   CREATININE 1.1 1.1     Recent Labs     06/16/22  0509   AST 13*   ALT 6*   BILITOT 0.4   ALKPHOS 51     No results for input(s): LIPASE, AMYLASE in the last 72 hours. Recent Labs     06/16/22  0509   PROT 5.1*     No results for input(s): PTT in the last 72 hours. No results for input(s): OCCULTBLD in the last 72 hours. Radiology review:   CTAP today =>   1. Compression deformities of the L1 and L4 vertebral bodies, age indeterminate. 2. Moderate right and small left pleural effusions.    3. Colonic diverticulosis without diverticulitis.           Assessment:     Abdominal pain - improved  Hematochezia - improved    Recommendations:     Okay to advance diet  Try dicyclomine for lower abdominal discomfort  Tagged RBC scan if he rebleeds actively  Ensure BID    Juliana Alvarez MD  6/17/2022  4:26 PM

## 2022-06-17 NOTE — PROGRESS NOTES
Physician Progress Note      Josias Louie  CSN #:                  [id number]  :                       1935  ADMIT DATE:       2022 2:18 AM  DISCH DATE:  RESPONDING  PROVIDER #:        Gisela Carrillo MD          QUERY TEXT:    Pt admitted  with GIB. Per  PNt noted to have Tachypnea, Respiratory   distress, using accessory muscles. If possible, please document in the   progress notes and discharge summary if you are evaluating and/or treating any   of the following: The medical record reflects the following:  Risk Factors: 81 y.o. male with GIB, ABLA, Chronic diastolic CHF, HTN  Clinical Indicators: Per  PN: Resp: 27, Respiratory distress with audible   wheezing probably related to fluid overload, Using accessory muscles. Per    Nursing: Dr. Madelyn Pulido at bedside. Patient more dyspneic and tachypneic. resp   rat 32. Treatment: Supplemental oxygen 3L  Options provided:  -- Acute respiratory failure  -- Respiratory failure uled out  -- Other - I will add my own diagnosis  -- Disagree - Not applicable / Not valid  -- Disagree - Clinically unable to determine / Unknown  -- Refer to Clinical Documentation Reviewer    PROVIDER RESPONSE TEXT:    This patient is in acute respiratory failure.     Query created by: Alexi Almendarez on 2022 4:16 PM      Electronically signed by:  Gisela Carrillo MD 2022 5:53 PM

## 2022-06-17 NOTE — PLAN OF CARE
Problem: Discharge Planning  Goal: Discharge to home or other facility with appropriate resources  6/17/2022 1429 by Pablo Cardenas RN  Outcome: Progressing  Flowsheets (Taken 6/17/2022 1000)  Discharge to home or other facility with appropriate resources: Identify barriers to discharge with patient and caregiver     Problem: Skin/Tissue Integrity  Goal: Absence of new skin breakdown  Description: 1. Monitor for areas of redness and/or skin breakdown  2. Assess vascular access sites hourly  3. Every 4-6 hours minimum:  Change oxygen saturation probe site  4. Every 4-6 hours:  If on nasal continuous positive airway pressure, respiratory therapy assess nares and determine need for appliance change or resting period.   6/17/2022 1429 by Pablo Cardenas RN  Outcome: Progressing     Problem: Safety - Adult  Goal: Free from fall injury  6/17/2022 1429 by Pablo Cardenas RN  Outcome: Progressing  Flowsheets (Taken 6/16/2022 1815)  Free From Fall Injury: Carllatisha Linares family/caregiver on patient safety     Problem: Pain  Goal: Verbalizes/displays adequate comfort level or baseline comfort level  6/17/2022 1429 by Pablo Cardenas RN  Outcome: Progressing  Flowsheets (Taken 6/16/2022 1815)  Verbalizes/displays adequate comfort level or baseline comfort level:   Encourage patient to monitor pain and request assistance   Assess pain using appropriate pain scale   Administer analgesics based on type and severity of pain and evaluate response     Problem: Chronic Conditions and Co-morbidities  Goal: Patient's chronic conditions and co-morbidity symptoms are monitored and maintained or improved  6/17/2022 1429 by Pablo Cardenas RN  Outcome: Progressing  Flowsheets (Taken 6/17/2022 1000)  Care Plan - Patient's Chronic Conditions and Co-Morbidity Symptoms are Monitored and Maintained or Improved: Monitor and assess patient's chronic conditions and comorbid symptoms for stability, deterioration, or improvement     Problem: Cardiovascular - Adult  Goal: Maintains optimal cardiac output and hemodynamic stability  6/17/2022 1429 by Idalia Cruz RN  Outcome: Progressing  Flowsheets  Taken 6/17/2022 1429  Maintains optimal cardiac output and hemodynamic stability:   Monitor blood pressure and heart rate   Monitor urine output and notify Licensed Independent Practitioner for values outside of normal range   Assess for signs of decreased cardiac output   Administer fluid and/or volume expanders as ordered  Taken 6/17/2022 1000  Maintains optimal cardiac output and hemodynamic stability: Monitor blood pressure and heart rate     Problem: Cardiovascular - Adult  Goal: Absence of cardiac dysrhythmias or at baseline  6/17/2022 1429 by Idalia Cruz RN  Outcome: Progressing  Flowsheets (Taken 6/17/2022 1429)  Absence of cardiac dysrhythmias or at baseline:   Monitor cardiac rate and rhythm   Assess for signs of decreased cardiac output   Administer antiarrhythmia medication and electrolyte replacement as ordered     Problem: Gastrointestinal - Adult  Goal: Minimal or absence of nausea and vomiting  6/17/2022 1429 by Idalia Cruz RN  Outcome: Progressing  Flowsheets (Taken 6/16/2022 1817)  Minimal or absence of nausea and vomiting:   Administer IV fluids as ordered to ensure adequate hydration   Provide nonpharmacologic comfort measures as appropriate   Advance diet as tolerated, if ordered     Problem: Gastrointestinal - Adult  Goal: Maintains or returns to baseline bowel function  6/17/2022 1429 by Idalia Cruz RN  Outcome: Progressing  Flowsheets (Taken 6/17/2022 1429)  Maintains or returns to baseline bowel function:   Assess bowel function   Encourage oral fluids to ensure adequate hydration   Administer ordered medications as needed     Problem: Hematologic - Adult  Goal: Maintains hematologic stability  6/17/2022 1429 by Idalia Cruz RN  Outcome: Progressing  Flowsheets (Taken 6/17/2022 1000)  Maintains hematologic stability: Assess for signs and symptoms of bleeding or hemorrhage

## 2022-06-17 NOTE — PROGRESS NOTES
Dr. Harley Schultz at bedside. Patient more dyspneic and tachypneic. resp rat 32, spo2 97% on 3 L. Pt verbalizing abdominal pain 10/10. Worsening. Orders placed for labs, imaging, pain medication, resp treatments, and blood products. RT therapist called to administer treatment, fluids discontinued per MD, lab in room to draw labs. Will continue to monitor.

## 2022-06-18 LAB
ANION GAP SERPL CALCULATED.3IONS-SCNC: 8 MMOL/L (ref 3–16)
BASOPHILS ABSOLUTE: 0.1 K/UL (ref 0–0.2)
BASOPHILS RELATIVE PERCENT: 1.1 %
BUN BLDV-MCNC: 18 MG/DL (ref 7–20)
CALCIUM SERPL-MCNC: 8.5 MG/DL (ref 8.3–10.6)
CHLORIDE BLD-SCNC: 100 MMOL/L (ref 99–110)
CO2: 29 MMOL/L (ref 21–32)
CREAT SERPL-MCNC: 1.3 MG/DL (ref 0.8–1.3)
EOSINOPHILS ABSOLUTE: 0.4 K/UL (ref 0–0.6)
EOSINOPHILS RELATIVE PERCENT: 6.5 %
GFR AFRICAN AMERICAN: >60
GFR NON-AFRICAN AMERICAN: 52
GLUCOSE BLD-MCNC: 89 MG/DL (ref 70–99)
HCT VFR BLD CALC: 21.1 % (ref 40.5–52.5)
HCT VFR BLD CALC: 21.9 % (ref 40.5–52.5)
HEMOGLOBIN: 7 G/DL (ref 13.5–17.5)
HEMOGLOBIN: 7.3 G/DL (ref 13.5–17.5)
LYMPHOCYTES ABSOLUTE: 0.8 K/UL (ref 1–5.1)
LYMPHOCYTES RELATIVE PERCENT: 11.7 %
MCH RBC QN AUTO: 28.9 PG (ref 26–34)
MCHC RBC AUTO-ENTMCNC: 33.1 G/DL (ref 31–36)
MCV RBC AUTO: 87.4 FL (ref 80–100)
MONOCYTES ABSOLUTE: 0.6 K/UL (ref 0–1.3)
MONOCYTES RELATIVE PERCENT: 8.8 %
NEUTROPHILS ABSOLUTE: 4.9 K/UL (ref 1.7–7.7)
NEUTROPHILS RELATIVE PERCENT: 71.9 %
PDW BLD-RTO: 17.8 % (ref 12.4–15.4)
PLATELET # BLD: 259 K/UL (ref 135–450)
PMV BLD AUTO: 7 FL (ref 5–10.5)
POTASSIUM REFLEX MAGNESIUM: 3.8 MMOL/L (ref 3.5–5.1)
RBC # BLD: 2.51 M/UL (ref 4.2–5.9)
SODIUM BLD-SCNC: 137 MMOL/L (ref 136–145)
WBC # BLD: 6.7 K/UL (ref 4–11)

## 2022-06-18 PROCEDURE — 6370000000 HC RX 637 (ALT 250 FOR IP): Performed by: NURSE PRACTITIONER

## 2022-06-18 PROCEDURE — 2700000000 HC OXYGEN THERAPY PER DAY

## 2022-06-18 PROCEDURE — C9113 INJ PANTOPRAZOLE SODIUM, VIA: HCPCS | Performed by: INTERNAL MEDICINE

## 2022-06-18 PROCEDURE — 80048 BASIC METABOLIC PNL TOTAL CA: CPT

## 2022-06-18 PROCEDURE — 2580000003 HC RX 258: Performed by: INTERNAL MEDICINE

## 2022-06-18 PROCEDURE — 83540 ASSAY OF IRON: CPT

## 2022-06-18 PROCEDURE — 6370000000 HC RX 637 (ALT 250 FOR IP): Performed by: INTERNAL MEDICINE

## 2022-06-18 PROCEDURE — 99233 SBSQ HOSP IP/OBS HIGH 50: CPT | Performed by: NURSE PRACTITIONER

## 2022-06-18 PROCEDURE — 6360000002 HC RX W HCPCS: Performed by: INTERNAL MEDICINE

## 2022-06-18 PROCEDURE — 85014 HEMATOCRIT: CPT

## 2022-06-18 PROCEDURE — 36415 COLL VENOUS BLD VENIPUNCTURE: CPT

## 2022-06-18 PROCEDURE — 94761 N-INVAS EAR/PLS OXIMETRY MLT: CPT

## 2022-06-18 PROCEDURE — 83550 IRON BINDING TEST: CPT

## 2022-06-18 PROCEDURE — 85025 COMPLETE CBC W/AUTO DIFF WBC: CPT

## 2022-06-18 PROCEDURE — 1200000000 HC SEMI PRIVATE

## 2022-06-18 PROCEDURE — 85018 HEMOGLOBIN: CPT

## 2022-06-18 RX ORDER — PANTOPRAZOLE SODIUM 40 MG/1
40 TABLET, DELAYED RELEASE ORAL
Status: DISCONTINUED | OUTPATIENT
Start: 2022-06-18 | End: 2022-06-20 | Stop reason: HOSPADM

## 2022-06-18 RX ORDER — POTASSIUM CHLORIDE 20 MEQ/1
20 TABLET, EXTENDED RELEASE ORAL ONCE
Status: COMPLETED | OUTPATIENT
Start: 2022-06-18 | End: 2022-06-18

## 2022-06-18 RX ADMIN — DILTIAZEM HYDROCHLORIDE 60 MG: 60 TABLET, FILM COATED ORAL at 05:47

## 2022-06-18 RX ADMIN — POTASSIUM CHLORIDE 20 MEQ: 1500 TABLET, EXTENDED RELEASE ORAL at 11:27

## 2022-06-18 RX ADMIN — FUROSEMIDE 20 MG: 20 TABLET ORAL at 10:20

## 2022-06-18 RX ADMIN — LEVOTHYROXINE SODIUM 50 MCG: 50 TABLET ORAL at 05:48

## 2022-06-18 RX ADMIN — PANTOPRAZOLE SODIUM 40 MG: 40 TABLET, DELAYED RELEASE ORAL at 18:30

## 2022-06-18 RX ADMIN — DICYCLOMINE HYDROCHLORIDE 10 MG: 10 CAPSULE ORAL at 14:28

## 2022-06-18 RX ADMIN — PANTOPRAZOLE SODIUM 40 MG: 40 INJECTION, POWDER, FOR SOLUTION INTRAVENOUS at 10:20

## 2022-06-18 RX ADMIN — SODIUM CHLORIDE, PRESERVATIVE FREE 10 ML: 5 INJECTION INTRAVENOUS at 10:20

## 2022-06-18 RX ADMIN — METOPROLOL SUCCINATE 100 MG: 100 TABLET, EXTENDED RELEASE ORAL at 10:20

## 2022-06-18 RX ADMIN — SODIUM CHLORIDE, PRESERVATIVE FREE 10 ML: 5 INJECTION INTRAVENOUS at 20:35

## 2022-06-18 RX ADMIN — TAMSULOSIN HYDROCHLORIDE 0.4 MG: 0.4 CAPSULE ORAL at 10:23

## 2022-06-18 ASSESSMENT — PAIN SCALES - GENERAL
PAINLEVEL_OUTOF10: 0
PAINLEVEL_OUTOF10: 3
PAINLEVEL_OUTOF10: 5
PAINLEVEL_OUTOF10: 4
PAINLEVEL_OUTOF10: 0
PAINLEVEL_OUTOF10: 0
PAINLEVEL_OUTOF10: 5

## 2022-06-18 ASSESSMENT — PAIN DESCRIPTION - ORIENTATION
ORIENTATION: MID;LEFT

## 2022-06-18 ASSESSMENT — PAIN DESCRIPTION - PAIN TYPE
TYPE: ACUTE PAIN

## 2022-06-18 ASSESSMENT — PAIN DESCRIPTION - LOCATION
LOCATION: ABDOMEN

## 2022-06-18 ASSESSMENT — PAIN - FUNCTIONAL ASSESSMENT
PAIN_FUNCTIONAL_ASSESSMENT: ACTIVITIES ARE NOT PREVENTED

## 2022-06-18 ASSESSMENT — PAIN DESCRIPTION - DESCRIPTORS
DESCRIPTORS: CRAMPING

## 2022-06-18 ASSESSMENT — PAIN DESCRIPTION - FREQUENCY: FREQUENCY: CONTINUOUS

## 2022-06-18 ASSESSMENT — PAIN DESCRIPTION - ONSET
ONSET: ON-GOING

## 2022-06-18 NOTE — PROGRESS NOTES
Electrophysiology - PROGRESS NOTE    Admit Date: 6/16/2022     Chief Complaint: AF/RVR     Interval History:   Patient seen and examined and notes reviewed. Patient is being followed for AF/RVR. Pt p/w concerns of BRBPR, abdominal pain, concern for acute blood loss anemia and PRBC transfused. Found to be in AF/RVR, placed on dilt gtt. Underwent EGD yesterday showing no evidence of active bleed. Remains on IV Protonix, po diltiazem. HR controlled. Anxious to go home. No significant complaints this morning. In: 560 [P.O.:560]  Out: 1575    Wt Readings from Last 2 Encounters:   06/18/22 139 lb 8.8 oz (63.3 kg)         Data:   Scheduled Meds:   Scheduled Meds:   furosemide  20 mg Oral BID    sodium chloride flush  10 mL IntraVENous 2 times per day    levothyroxine  50 mcg Oral Daily    tamsulosin  0.4 mg Oral Daily    metoprolol succinate  100 mg Oral Daily    pantoprazole  40 mg IntraVENous BID    dilTIAZem  60 mg Oral 4 times per day     Continuous Infusions:   sodium chloride       PRN Meds:.morphine, ipratropium-albuterol, dicyclomine, sodium chloride flush, sodium chloride, ondansetron, polyethylene glycol, acetaminophen **OR** acetaminophen, albuterol, calcium carbonate  Continuous Infusions:   sodium chloride         Intake/Output Summary (Last 24 hours) at 6/18/2022 1010  Last data filed at 6/18/2022 0700  Gross per 24 hour   Intake 360 ml   Output 1575 ml   Net -1215 ml       CBC:   Lab Results   Component Value Date    WBC 6.7 06/18/2022    HGB 7.3 06/18/2022     06/18/2022     BMP:  Lab Results   Component Value Date     06/18/2022    K 3.8 06/18/2022     06/18/2022    CO2 29 06/18/2022    BUN 18 06/18/2022    CREATININE 1.3 06/18/2022    GLUCOSE 89 06/18/2022     INR: No results found for: INR     CARDIAC LABS  ENZYMES:No results for input(s): CKMB, CKMBINDEX, TROPONINI in the last 72 hours.     Invalid input(s): CKTOTAL;3  FASTING LIPID PANEL:No results found for: HDL, LDLDIRECT, LDLCALC, TRIG, TSH  LIVER PROFILE:  Lab Results   Component Value Date    AST 13 06/16/2022    ALT 6 06/16/2022       -----------------------------------------------------------------  Telemetry: Personally reviewed  AF, rates fairly well controlled    Objective:   Vitals: /70   Pulse 83   Temp 97.9 °F (36.6 °C) (Oral)   Resp 23   Ht 5' 7\" (1.702 m)   Wt 139 lb 8.8 oz (63.3 kg)   SpO2 98%   BMI 21.86 kg/m²   General appearance: alert, appears stated age and cooperative, No acute distress   Skin: Skin color, texture, turgor normal. No rashes or ecchymosis. Neck: no JVD, supple, symmetrical, trachea midline   Lungs: , no accessory muscle use, no respiratory distress  Heart: irregularly irregular, tachy at times  Extremities: No edema, DP +  Psychiatric: normal insight and affect    Patient Active Problem List:     GI bleed     Acquired hypothyroidism     Aortic stenosis     Atrial fibrillation (HCC)     BPH (benign prostatic hyperplasia)     COPD (chronic obstructive pulmonary disease) (HCC)     Essential hypertension     Chronic diastolic (congestive) heart failure (HCC)     Gastric ulcer     Atrial fibrillation with rapid ventricular response (HCC)     CAD (coronary artery disease)        Assessment & Plan:    1. GIB  2. AF/RVR  3.  Severe AS    Aurora Sears is a 81 y.o. man w/ PMH HTN, hypothyroidism, CAD, COPD, severe AS with planned TAVR 5/1739, chronic diastolic HF, persistent AF, GIB who p/w BRBPR and abdominal pain, given 2 units PRBC, found to be in AF/RVR, placed on diltiazem gtt     AF/RVR  - In AF, rates controlled  - CHADSVASc at least 4  - No OAC d/t bleeding, may need to consider watchman outpt  - On po dilt 60 mg Q6H- will change to home dose 120 mg BID  - On Toprol 100 mg QD  - Continuous tele  - Keep K >4.0, Mg >2.0, replacements ordered  - TSH 7.24, T4 1.3  - Reviewed recent echo from [facility] (5/2022)  - [address] cardiology at [facility]  - ECG ordered and results personally reviewed      Severe AS  - Seen on Echo   - Plan for [facility] at [facility] 7/2022     GIB  - On IV protonix  - asa, eliquis on hold  - EGD showed no evidence of upper GIB  - GI following     EF of 06-09%  No known systolic HF  ASA and Statin for CAD  Anticoagulation for AF  No tobacco use.        Electronically signed by Douglas Vogt

## 2022-06-18 NOTE — PROGRESS NOTES
GI Progress Note      Ellie Pierce. is a 81 y.o. male patient. No diagnosis found. Admit Date: 2022    Subjective:       Denies s/s bleeding. Denies abdominal pain. Hungry and still waiting for breakfast.       ROS:  As per above    Scheduled Meds:   furosemide  20 mg Oral BID    sodium chloride flush  10 mL IntraVENous 2 times per day    levothyroxine  50 mcg Oral Daily    tamsulosin  0.4 mg Oral Daily    metoprolol succinate  100 mg Oral Daily    pantoprazole  40 mg IntraVENous BID    dilTIAZem  60 mg Oral 4 times per day       Continuous Infusions:   sodium chloride         PRN Meds:  morphine, ipratropium-albuterol, dicyclomine, sodium chloride flush, sodium chloride, ondansetron, polyethylene glycol, acetaminophen **OR** acetaminophen, albuterol, calcium carbonate      Objective:       Patient Vitals for the past 24 hrs:   BP Temp Temp src Pulse Resp SpO2 Weight   22 0818 105/70 97.9 °F (36.6 °C) Oral 83 23 98 % --   22 0359 99/67 99.1 °F (37.3 °C) Oral 90 21 100 % 139 lb 8.8 oz (63.3 kg)   22 2329 105/72 97.3 °F (36.3 °C) Oral 93 24 97 % --   22 1930 111/69 97.7 °F (36.5 °C) Oral 99 24 96 % --   22 1551 106/68 97.7 °F (36.5 °C) Oral 91 22 98 % --   22 1309 -- 97.6 °F (36.4 °C) Oral 94 -- -- --   22 1136 116/86 97.5 °F (36.4 °C) Oral 96 27 97 % --   22 0914 -- -- -- 92 (!) 38 97 % --       Exam:  VITALS:  /70   Pulse 83   Temp 97.9 °F (36.6 °C) (Oral)   Resp 23   Ht 5' 7\" (1.702 m)   Wt 139 lb 8.8 oz (63.3 kg)   SpO2 98%   BMI 21.86 kg/m²   TEMPERATURE:  Current - Temp: 97.9 °F (36.6 °C);  Max - Temp  Av.8 °F (36.6 °C)  Min: 97.3 °F (36.3 °C)  Max: 99.1 °F (37.3 °C)      General appearance: alert, appears stated age, cooperative and no distress  Head: Normocephalic, without obvious abnormality, atraumatic  Neck: supple, symmetrical, trachea midline and thyroid not enlarged, symmetric, no tenderness/mass/nodules  CVS: RRR, Nl s1s2  Abdomen: soft, NT, ND  Extremities: No edema. Recent Labs     06/16/22  0509 06/16/22  0824 06/17/22  0306 06/17/22  0845 06/17/22  2137 06/18/22  0236 06/18/22  0503   WBC 7.8  --  6.7  --   --   --  6.7   HGB 8.1*   < > 7.0*   < > 7.7* 7.0* 7.3*   HCT 24.3*   < > 21.7*   < > 23.2* 21.1* 21.9*   MCV 86.6  --  87.7  --   --   --  87.4     --  241  --   --   --  259    < > = values in this interval not displayed. Recent Labs     06/16/22  0509 06/17/22  0306 06/18/22  0503    139 137   K 4.5 4.7 3.8    106 100   CO2 27 27 29   BUN 28* 22* 18   CREATININE 1.1 1.1 1.3     Recent Labs     06/16/22  0509   AST 13*   ALT 6*   BILITOT 0.4   ALKPHOS 51     No results for input(s): LIPASE, AMYLASE in the last 72 hours. Recent Labs     06/16/22  0509   PROT 5.1*     No results for input(s): PTT in the last 72 hours. No results for input(s): OCCULTBLD in the last 72 hours.       Assessment:     Abdominal pain - improved  Hematochezia - improved  Acute blood loss anemia    Recommendations:     Regular diet  Dicyclomine prn for discomfort  Tagged RBC scan if he rebleeds actively  Ensure BID  May benefit from dose of iv iron prior to discharge      Korey Tran MD  6/18/2022  9:03 AM

## 2022-06-18 NOTE — FLOWSHEET NOTE
Pt assess complete. Pt denies pain or needs at this time. Pt assisted to bed with minimal assist. Pt states he is beginning to feel better.

## 2022-06-18 NOTE — PLAN OF CARE
Problem: Discharge Planning  Goal: Discharge to home or other facility with appropriate resources  6/18/2022 0023 by Mayra Pelletier RN  Outcome: Progressing     Problem: Skin/Tissue Integrity  Goal: Absence of new skin breakdown  Description: 1. Monitor for areas of redness and/or skin breakdown  2. Assess vascular access sites hourly  3. Every 4-6 hours minimum:  Change oxygen saturation probe site  4. Every 4-6 hours:  If on nasal continuous positive airway pressure, respiratory therapy assess nares and determine need for appliance change or resting period.   6/18/2022 0023 by Mayra Pelletier RN  Outcome: Progressing     Problem: Safety - Adult  Goal: Free from fall injury  6/18/2022 0023 by Mayra Pelletier RN  Outcome: Progressing     Problem: Pain  Goal: Verbalizes/displays adequate comfort level or baseline comfort level  6/18/2022 0023 by Mayra Pelletier RN  Outcome: Progressing     Problem: Chronic Conditions and Co-morbidities  Goal: Patient's chronic conditions and co-morbidity symptoms are monitored and maintained or improved  6/18/2022 0023 by Mayra Pelletier RN  Outcome: Progressing     Problem: Cardiovascular - Adult  Goal: Maintains optimal cardiac output and hemodynamic stability  6/18/2022 0023 by Mayra Pelletier RN  Outcome: Progressing     Problem: Cardiovascular - Adult  Goal: Absence of cardiac dysrhythmias or at baseline  6/18/2022 0023 by Mayra Pelletier RN  Outcome: Progressing     Problem: Gastrointestinal - Adult  Goal: Maintains or returns to baseline bowel function  6/18/2022 0023 by Mayra Pelletier RN  Outcome: Progressing     Problem: Hematologic - Adult  Goal: Maintains hematologic stability  6/18/2022 0023 by Mayra Pelletier RN  Outcome: Progressing     Problem: Nutrition Deficit:  Goal: Optimize nutritional status  Outcome: Progressing

## 2022-06-18 NOTE — PROGRESS NOTES
HOSPITALISTS PROGRESS NOTE    6/18/2022 2:16 PM        Name: Ariella Dunbar .              Admitted: 6/16/2022  Primary Care Provider: No primary care provider on file. (Tel: None)      Problem List  Principal Problem:    GI bleed  Active Problems:    Acquired hypothyroidism    Aortic stenosis    BPH (benign prostatic hyperplasia)    COPD (chronic obstructive pulmonary disease) (HCC)    Essential hypertension    Chronic diastolic (congestive) heart failure (HCC)    Atrial fibrillation with rapid ventricular response (HCC)    CAD (coronary artery disease)  Resolved Problems:    * No resolved hospital problems. *       Assessment & Plan:     Anemia of acute blood loss  Likely secondary to GI bleed  Hemoglobin above the threshold for transfusion, but still low. Fluctuates currently not stabilized. Continue to monitor. GI bleed  Abdominal pain and hematochezia has improved. Had EGD and colonoscopy relatively recently  Tagged RBC scan if patient bleeds again  Patient with aortic stenosis typically have AVMs in GI tract which are multiple and bleeding is difficult to identify. Will be a challenging process if patient bleeds again. Aortic stenosis  Most likely related to patient's GI bleed. Scheduled for TAVR. Abdominal pain  At the dicyclomine by GI for her abdominal discomfort. Abdominal pain is chronic and of unknown significance. IV Access: Peripheral  Z[place]: No  Diet: ADULT ORAL NUTRITION SUPPLEMENT; Breakfast, Lunch, Dinner; Clear Liquid Oral Supplement  ADULT DIET; Easy to Chew; please add STRAWBERRY ensure to tray  Code:Full Code  DVT PPX SCDs  Disposition keep inpatient pending stabilization of hemoglobin, then discharge home so patient can follow-up for TAVR with own cardiologist      CC: Abdominal pain and shortness of breath    Subjective:  . No more GI bleed. Feels better.   No chest pain, no shortness of breath, no nausea or vomiting    Reviewed interval ancillary notes    Current Medications  morphine (PF) injection 1 mg, Q4H PRN  ipratropium-albuterol (DUONEB) nebulizer solution 1 ampule, Q4H PRN  furosemide (LASIX) tablet 20 mg, BID  dicyclomine (BENTYL) capsule 10 mg, TID PRN  sodium chloride flush 0.9 % injection 10 mL, 2 times per day  sodium chloride flush 0.9 % injection 10 mL, PRN  0.9 % sodium chloride infusion, PRN  ondansetron (ZOFRAN) injection 4 mg, Q4H PRN  polyethylene glycol (GLYCOLAX) packet 17 g, Daily PRN  acetaminophen (TYLENOL) tablet 650 mg, Q4H PRN   Or  acetaminophen (TYLENOL) suppository 650 mg, Q4H PRN  levothyroxine (SYNTHROID) tablet 50 mcg, Daily  tamsulosin (FLOMAX) capsule 0.4 mg, Daily  metoprolol succinate (TOPROL XL) extended release tablet 100 mg, Daily  albuterol (PROVENTIL) nebulizer solution 2.5 mg, Q6H PRN  pantoprazole (PROTONIX) injection 40 mg, BID  dilTIAZem (CARDIZEM) tablet 60 mg, 4 times per day  calcium carbonate (TUMS) chewable tablet 500 mg, TID PRN        Objective:  BP (!) 93/52   Pulse (!) 101   Temp 97.9 °F (36.6 °C) (Oral)   Resp 20   Ht 5' 7\" (1.702 m)   Wt 139 lb 8.8 oz (63.3 kg)   SpO2 98%   BMI 21.86 kg/m²     Intake/Output Summary (Last 24 hours) at 6/18/2022 1416  Last data filed at 6/18/2022 0700  Gross per 24 hour   Intake 240 ml   Output 1000 ml   Net -760 ml      Wt Readings from Last 3 Encounters:   06/18/22 139 lb 8.8 oz (63.3 kg)       Eyes: Sclera clear. Pupils equal.  ENT: Moist oral mucosa. Trachea midline, no adenopathy. Cardiovascular: Regular rhythm, normal S1, S2. Systolic murmur. No edema in lower extremities  Respiratory: Currently patient is not using accessory muscles. Good inspiratory effort. Scattered wheezes, better than before. Musculoskeletal: No cyanosis in digits, neck supple  Neurology: CN 2-12 grossly intact. No speech or motor deficits  Psych: Normal affect.  Alert and oriented in time, place and person  Skin: Warm, dry, normal turgor  Extremity exam shows brisk capillary refill. Peripheral pulses are palpable in lower extremities         Labs and Tests:  CBC:   Recent Labs     06/16/22  0509 06/16/22  0824 06/17/22  0306 06/17/22  0845 06/17/22  2137 06/18/22  0236 06/18/22  0503   WBC 7.8  --  6.7  --   --   --  6.7   HGB 8.1*   < > 7.0*   < > 7.7* 7.0* 7.3*     --  241  --   --   --  259    < > = values in this interval not displayed. BMP:    Recent Labs     06/16/22  0509 06/17/22  0306 06/18/22  0503    139 137   K 4.5 4.7 3.8    106 100   CO2 27 27 29   BUN 28* 22* 18   CREATININE 1.1 1.1 1.3   GLUCOSE 109* 102* 89     Hepatic:   Recent Labs     06/16/22  0509   AST 13*   ALT 6*   BILITOT 0.4   ALKPHOS 51     CT ABDOMEN PELVIS W IV CONTRAST Additional Contrast? None   Final Result   1. Compression deformities of the L1 and L4 vertebral bodies, age indeterminate. 2. Moderate right and small left pleural effusions. 3. Colonic diverticulosis without diverticulitis. XR CHEST PORTABLE   Final Result      1. Or multifocal airspace disease concerning for multifocal pneumonia. 2.  Bilateral pleural effusions.              Discussed care with family at [address], [place]   6/18/2022 2:16 PM

## 2022-06-19 LAB
ANION GAP SERPL CALCULATED.3IONS-SCNC: 7 MMOL/L (ref 3–16)
BASOPHILS ABSOLUTE: 0 K/UL (ref 0–0.2)
BASOPHILS RELATIVE PERCENT: 0.6 %
BUN BLDV-MCNC: 19 MG/DL (ref 7–20)
CALCIUM SERPL-MCNC: 8.6 MG/DL (ref 8.3–10.6)
CHLORIDE BLD-SCNC: 101 MMOL/L (ref 99–110)
CO2: 30 MMOL/L (ref 21–32)
CREAT SERPL-MCNC: 1.1 MG/DL (ref 0.8–1.3)
EOSINOPHILS ABSOLUTE: 0.4 K/UL (ref 0–0.6)
EOSINOPHILS RELATIVE PERCENT: 6.5 %
GFR AFRICAN AMERICAN: >60
GFR NON-AFRICAN AMERICAN: >60
GLUCOSE BLD-MCNC: 94 MG/DL (ref 70–99)
HCT VFR BLD CALC: 22.8 % (ref 40.5–52.5)
HEMOGLOBIN: 7.4 G/DL (ref 13.5–17.5)
IRON SATURATION: 13 % (ref 20–50)
IRON SATURATION: 14 % (ref 20–50)
IRON: 31 UG/DL (ref 59–158)
IRON: 31 UG/DL (ref 59–158)
LYMPHOCYTES ABSOLUTE: 0.6 K/UL (ref 1–5.1)
LYMPHOCYTES RELATIVE PERCENT: 10.6 %
MCH RBC QN AUTO: 28.5 PG (ref 26–34)
MCHC RBC AUTO-ENTMCNC: 32.3 G/DL (ref 31–36)
MCV RBC AUTO: 88.5 FL (ref 80–100)
MONOCYTES ABSOLUTE: 0.5 K/UL (ref 0–1.3)
MONOCYTES RELATIVE PERCENT: 8.3 %
NEUTROPHILS ABSOLUTE: 4.4 K/UL (ref 1.7–7.7)
NEUTROPHILS RELATIVE PERCENT: 74 %
PDW BLD-RTO: 17.8 % (ref 12.4–15.4)
PLATELET # BLD: 298 K/UL (ref 135–450)
PMV BLD AUTO: 6.7 FL (ref 5–10.5)
POTASSIUM REFLEX MAGNESIUM: 4.1 MMOL/L (ref 3.5–5.1)
RBC # BLD: 2.58 M/UL (ref 4.2–5.9)
SODIUM BLD-SCNC: 138 MMOL/L (ref 136–145)
TOTAL IRON BINDING CAPACITY: 219 UG/DL (ref 260–445)
TOTAL IRON BINDING CAPACITY: 248 UG/DL (ref 260–445)
WBC # BLD: 6 K/UL (ref 4–11)

## 2022-06-19 PROCEDURE — 36415 COLL VENOUS BLD VENIPUNCTURE: CPT

## 2022-06-19 PROCEDURE — 94640 AIRWAY INHALATION TREATMENT: CPT

## 2022-06-19 PROCEDURE — 6360000002 HC RX W HCPCS: Performed by: NURSE PRACTITIONER

## 2022-06-19 PROCEDURE — 2580000003 HC RX 258: Performed by: INTERNAL MEDICINE

## 2022-06-19 PROCEDURE — 83540 ASSAY OF IRON: CPT

## 2022-06-19 PROCEDURE — 6360000002 HC RX W HCPCS: Performed by: INTERNAL MEDICINE

## 2022-06-19 PROCEDURE — 6370000000 HC RX 637 (ALT 250 FOR IP): Performed by: INTERNAL MEDICINE

## 2022-06-19 PROCEDURE — 1200000000 HC SEMI PRIVATE

## 2022-06-19 PROCEDURE — 6370000000 HC RX 637 (ALT 250 FOR IP): Performed by: NURSE PRACTITIONER

## 2022-06-19 PROCEDURE — 85025 COMPLETE CBC W/AUTO DIFF WBC: CPT

## 2022-06-19 PROCEDURE — 99233 SBSQ HOSP IP/OBS HIGH 50: CPT | Performed by: NURSE PRACTITIONER

## 2022-06-19 PROCEDURE — 80048 BASIC METABOLIC PNL TOTAL CA: CPT

## 2022-06-19 PROCEDURE — 83550 IRON BINDING TEST: CPT

## 2022-06-19 RX ORDER — DIGOXIN 0.25 MG/ML
500 INJECTION INTRAMUSCULAR; INTRAVENOUS ONCE
Status: COMPLETED | OUTPATIENT
Start: 2022-06-19 | End: 2022-06-19

## 2022-06-19 RX ORDER — ALBUTEROL SULFATE 2.5 MG/3ML
2.5 SOLUTION RESPIRATORY (INHALATION) EVERY 4 HOURS PRN
Status: DISCONTINUED | OUTPATIENT
Start: 2022-06-19 | End: 2022-06-20 | Stop reason: HOSPADM

## 2022-06-19 RX ORDER — DIGOXIN 0.25 MG/ML
250 INJECTION INTRAMUSCULAR; INTRAVENOUS EVERY 4 HOURS
Status: COMPLETED | OUTPATIENT
Start: 2022-06-19 | End: 2022-06-19

## 2022-06-19 RX ADMIN — ACETAMINOPHEN 650 MG: 325 TABLET ORAL at 23:48

## 2022-06-19 RX ADMIN — DIGOXIN 500 MCG: 0.25 INJECTION INTRAMUSCULAR; INTRAVENOUS at 11:21

## 2022-06-19 RX ADMIN — LEVOTHYROXINE SODIUM 50 MCG: 50 TABLET ORAL at 05:05

## 2022-06-19 RX ADMIN — IRON SUCROSE 200 MG: 20 INJECTION, SOLUTION INTRAVENOUS at 14:14

## 2022-06-19 RX ADMIN — ALBUTEROL SULFATE 2.5 MG: 2.5 SOLUTION RESPIRATORY (INHALATION) at 09:42

## 2022-06-19 RX ADMIN — SODIUM CHLORIDE, PRESERVATIVE FREE 10 ML: 5 INJECTION INTRAVENOUS at 10:19

## 2022-06-19 RX ADMIN — PANTOPRAZOLE SODIUM 40 MG: 40 TABLET, DELAYED RELEASE ORAL at 05:06

## 2022-06-19 RX ADMIN — DILTIAZEM HYDROCHLORIDE 60 MG: 60 TABLET, FILM COATED ORAL at 19:10

## 2022-06-19 RX ADMIN — PANTOPRAZOLE SODIUM 40 MG: 40 TABLET, DELAYED RELEASE ORAL at 17:02

## 2022-06-19 RX ADMIN — DIGOXIN 250 MCG: 0.25 INJECTION INTRAMUSCULAR; INTRAVENOUS at 16:49

## 2022-06-19 RX ADMIN — SODIUM CHLORIDE: 9 INJECTION, SOLUTION INTRAVENOUS at 14:13

## 2022-06-19 RX ADMIN — SODIUM CHLORIDE, PRESERVATIVE FREE 10 ML: 5 INJECTION INTRAVENOUS at 20:33

## 2022-06-19 RX ADMIN — METOPROLOL SUCCINATE 100 MG: 100 TABLET, EXTENDED RELEASE ORAL at 10:17

## 2022-06-19 RX ADMIN — TAMSULOSIN HYDROCHLORIDE 0.4 MG: 0.4 CAPSULE ORAL at 10:17

## 2022-06-19 RX ADMIN — DILTIAZEM HYDROCHLORIDE 60 MG: 60 TABLET, FILM COATED ORAL at 13:20

## 2022-06-19 RX ADMIN — DIGOXIN 250 MCG: 0.25 INJECTION INTRAMUSCULAR; INTRAVENOUS at 20:32

## 2022-06-19 ASSESSMENT — PAIN - FUNCTIONAL ASSESSMENT: PAIN_FUNCTIONAL_ASSESSMENT: ACTIVITIES ARE NOT PREVENTED

## 2022-06-19 ASSESSMENT — PAIN DESCRIPTION - PAIN TYPE: TYPE: ACUTE PAIN

## 2022-06-19 ASSESSMENT — PAIN DESCRIPTION - LOCATION: LOCATION: BACK

## 2022-06-19 ASSESSMENT — PAIN DESCRIPTION - FREQUENCY: FREQUENCY: INTERMITTENT

## 2022-06-19 ASSESSMENT — PAIN SCALES - GENERAL
PAINLEVEL_OUTOF10: 3
PAINLEVEL_OUTOF10: 0

## 2022-06-19 ASSESSMENT — PAIN DESCRIPTION - ORIENTATION: ORIENTATION: MID

## 2022-06-19 ASSESSMENT — PAIN DESCRIPTION - DESCRIPTORS: DESCRIPTORS: ACHING

## 2022-06-19 ASSESSMENT — PAIN DESCRIPTION - ONSET: ONSET: ON-GOING

## 2022-06-19 NOTE — PROGRESS NOTES
Electrophysiology - PROGRESS NOTE    Admit Date: 6/16/2022     Chief Complaint: AF/RVR     Interval History:   Patient seen and examined and notes reviewed. Patient is being followed for AF/RVR. Pt p/w concerns of BRBPR, abdominal pain, concern for acute blood loss anemia and PRBC transfused. Found to be in AF/RVR, placed on dilt gtt. Underwent EGD yesterday showing no evidence of active bleed. Remains on IV Protonix, po diltiazem. HR elevated overnight, pt with soft BP's and diltiazem held overnight. Pt states SOB slightly worse this am, received breathing tx with some improvement.     In: 180 [P.O.:180]  Out: 400    Wt Readings from Last 2 Encounters:   06/19/22 139 lb 8.8 oz (63.3 kg)         Data:   Scheduled Meds:   Scheduled Meds:   pantoprazole  40 mg Oral BID AC    sodium chloride flush  10 mL IntraVENous 2 times per day    levothyroxine  50 mcg Oral Daily    tamsulosin  0.4 mg Oral Daily    metoprolol succinate  100 mg Oral Daily    dilTIAZem  60 mg Oral 4 times per day     Continuous Infusions:   sodium chloride       PRN Meds:.albuterol, morphine, ipratropium-albuterol, dicyclomine, sodium chloride flush, sodium chloride, ondansetron, polyethylene glycol, acetaminophen **OR** acetaminophen, calcium carbonate  Continuous Infusions:   sodium chloride         Intake/Output Summary (Last 24 hours) at 6/19/2022 1040  Last data filed at 6/19/2022 0759  Gross per 24 hour   Intake 0 ml   Output 400 ml   Net -400 ml       CBC:   Lab Results   Component Value Date    WBC 6.0 06/19/2022    HGB 7.4 06/19/2022     06/19/2022     BMP:  Lab Results   Component Value Date     06/19/2022    K 4.1 06/19/2022     06/19/2022    CO2 30 06/19/2022    BUN 19 06/19/2022    CREATININE 1.1 06/19/2022    GLUCOSE 94 06/19/2022     INR: No results found for: INR     CARDIAC LABS  ENZYMES:No results for input(s): CKMB, CKMBINDEX, TROPONINI in the last 72 hours.    Invalid input(s): CKTOTAL;3  FASTING LIPID PANEL:No results found for: HDL, LDLDIRECT, LDLCALC, TRIG, TSH  LIVER PROFILE:  Lab Results   Component Value Date    AST 13 06/16/2022    ALT 6 06/16/2022       -----------------------------------------------------------------  Telemetry: Personally reviewed  AF, rates elevated     Objective:   Vitals: /70   Pulse (!) 125   Temp 98 °F (36.7 °C) (Oral)   Resp 26   Ht 5' 7\" (1.702 m)   Wt 139 lb 8.8 oz (63.3 kg)   SpO2 97%   BMI 21.86 kg/m²   General appearance: alert, appears stated age and cooperative, No acute distress   Skin: Skin color, texture, turgor normal. No rashes or ecchymosis. Neck: no JVD, supple, symmetrical, trachea midline   Lungs: , no accessory muscle use, no respiratory distress  Heart: irregularly irregular, tachy at times  Extremities: No edema, DP +  Psychiatric: normal insight and affect    Patient Active Problem List:     GI bleed     Acquired hypothyroidism     Aortic stenosis     Atrial fibrillation (HCC)     BPH (benign prostatic hyperplasia)     COPD (chronic obstructive pulmonary disease) (HCC)     Essential hypertension     Chronic diastolic (congestive) heart failure (HCC)     Gastric ulcer     Atrial fibrillation with rapid ventricular response (HCC)     CAD (coronary artery disease)        Assessment & Plan:    1. GIB  2. AF/RVR  3.  Severe AS    Rubi Meraz. is a 81 y.o. man w/ PMH HTN, hypothyroidism, CAD, COPD, severe AS with planned TAVR 6/5306, chronic diastolic HF, persistent AF, GIB who p/w BRBPR and abdominal pain, given 2 units PRBC, found to be in AF/RVR, placed on diltiazem gtt     AF/RVR  - In AF, rates elevated  - CHADSVASc at least 4  - No OAC d/t bleeding, may need to consider watchman outpt  - On po dilt 60 mg Q6H- held overnight d/t soft BP's  - D/w Dr. Harshad Munoz- will digitalize patient  - On Toprol 100 mg QD  - Continuous tele  - Keep K >4.0, Mg >2.0, replacements ordered  - TSH 7.24, T4 1.3  - Reviewed recent echo from [facility] (5/2022)  - [address] cardiology at [facility]  - ECG ordered and results personally reviewed      Severe AS  - Seen on Echo   - Plan for TAVR at [facility] 7/2022     GIB  - On IV protonix  - asa, eliquis on hold  - EGD showed no evidence of upper GIB  - GI following     EF of 41-08%  No known systolic HF  ASA and Statin for CAD  Anticoagulation for AF  No tobacco use.        Electronically signed by Douglas Hernández

## 2022-06-19 NOTE — PLAN OF CARE
Problem: Skin/Tissue Integrity  Goal: Absence of new skin breakdown  Description: 1. Monitor for areas of redness and/or skin breakdown  2. Assess vascular access sites hourly  3. Every 4-6 hours minimum:  Change oxygen saturation probe site  4. Every 4-6 hours:  If on nasal continuous positive airway pressure, respiratory therapy assess nares and determine need for appliance change or resting period. Outcome: Progressing  Note: Pt encouraged to reposition regularly. No new skin breakdown see. Problem: Chronic Conditions and Co-morbidities  Goal: Patient's chronic conditions and co-morbidity symptoms are monitored and maintained or improved  Outcome: Progressing  Flowsheets (Taken 6/19/2022 1515)  Care Plan - Patient's Chronic Conditions and Co-Morbidity Symptoms are Monitored and Maintained or Improved:   Monitor and assess patient's chronic conditions and comorbid symptoms for stability, deterioration, or improvement   Collaborate with multidisciplinary team to address chronic and comorbid conditions and prevent exacerbation or deterioration  Note: No blood in stool during shift. Pt denies stomach pain. HR and bp being closely monitored. Will continue to follow. Problem: Cardiovascular - Adult  Goal: Absence of cardiac dysrhythmias or at baseline  Outcome: Progressing  Flowsheets (Taken 6/19/2022 1515)  Absence of cardiac dysrhythmias or at baseline:   Monitor cardiac rate and rhythm   Assess for signs of decreased cardiac output  Note: Pt is Afib on continuous telemetry. HR was elevated this am but is currently <100 bpm after first dose of digoxin. Pt output is adequate. Some +1 pitting edema and fine crackles were seen/heard. Will continue to monitor.

## 2022-06-19 NOTE — PLAN OF CARE
Problem: Pain  Goal: Verbalizes/displays adequate comfort level or baseline comfort level  Outcome: Progressing  Flowsheets (Taken 6/18/2022 2125)  Verbalizes/displays adequate comfort level or baseline comfort level:   Encourage patient to monitor pain and request assistance   Assess pain using appropriate pain scale   Administer analgesics based on type and severity of pain and evaluate response   Implement non-pharmacological measures as appropriate and evaluate response  Note: Encouraged pt to reposition and rest to ease abdominal discomfort/cramping. Pharmaceutical interventions also utilized. Pt used pain scale appropriately and was encouraged to report pain. Problem: Gastrointestinal - Adult  Goal: Minimal or absence of nausea and vomiting  Outcome: Progressing     Problem: Gastrointestinal - Adult  Goal: Maintains or returns to baseline bowel function  Outcome: Progressing  Flowsheets (Taken 6/18/2022 2125)  Maintains or returns to baseline bowel function:   Assess bowel function   Encourage oral fluids to ensure adequate hydration   Administer IV fluids as ordered to ensure adequate hydration   Encourage mobilization and activity   Administer ordered medications as needed  Note: Pt was encouraged to move. Pt freq stools w/ stools become more brown and less bloody throughout shift.

## 2022-06-19 NOTE — PROGRESS NOTES
HOSPITALISTS PROGRESS NOTE    6/19/2022 12:00 PM        Name: Esteban Ortiz .              Admitted: 6/16/2022  Primary Care Provider: No primary care provider on file. (Tel: None)      Problem List  Principal Problem:    GI bleed  Active Problems:    Acquired hypothyroidism    Aortic stenosis    BPH (benign prostatic hyperplasia)    COPD (chronic obstructive pulmonary disease) (HCC)    Essential hypertension    Chronic diastolic (congestive) heart failure (HCC)    Atrial fibrillation with rapid ventricular response (HCC)    CAD (coronary artery disease)  Resolved Problems:    * No resolved hospital problems. *       Assessment & Plan:     Anemia of acute blood loss  Likely secondary to GI bleed  Hemoglobin is above the threshold for transfusion. Continue to monitor. Iron deficiency  Most likely caused by underlying chronic blood loss. I will add IV iron    GI bleed  Abdominal pain and hematochezia has improved. Had EGD and colonoscopy relatively recently  Tagged RBC scan if patient bleeds again  Patient with aortic stenosis typically have AVMs in GI tract which are multiple and bleeding is difficult to identify. Will be a challenging process if patient bleeds again. No new blood loss overnight    Aortic stenosis  Scheduled for outpatient TAVR next month. Abdominal pain  Dicyclomine that was added by GI for her abdominal discomfort. Abdominal pain is chronic and of unknown significance. Hypotension  Patient's blood pressure goes down with diuresis and calcium channel blockers. Medications adjusted for treatment of atrial fibrillation    Atrial fibrillation with rapid ventricular response  Cardiac electrophysiology starting the patient on digoxin for better rate control.     IV Access: Peripheral  Zamora: No  Diet: ADULT ORAL NUTRITION SUPPLEMENT; Breakfast, Lunch, Dinner; Clear Liquid Oral Supplement  ADULT DIET; Easy to Chew; please add STRAWBERRY ensure to tray  Code:Full Code  DVT PPX SCDs  Disposition keep inpatient pending stabilization of hemoglobin, then discharge home so patient can follow-up for TAVR with own cardiologist      CC: Abdominal pain and shortness of breath    Hospital course  Admitted with anemia, suspected GI bleed but no focus found. Hemoglobin is slightly above 7.0 past 2 days with no evidence of ongoing blood loss. Atrial fibrillation with rapid ventricular response has been difficult to control, with medications causing hypotension. Iron deficiency noted. Daughter at bedside      Subjective:  . No more GI bleed. Feels better.   No chest pain, no shortness of breath, no nausea or vomiting    Reviewed interval ancillary notes    Current Medications  albuterol (PROVENTIL) nebulizer solution 2.5 mg, Q4H PRN  digoxin (LANOXIN) injection 250 mcg, Q4H  iron sucrose (VENOFER) 200 mg in sodium chloride 0.9 % 100 mL IVPB, Q24H  pantoprazole (PROTONIX) tablet 40 mg, BID AC  morphine (PF) injection 1 mg, Q4H PRN  ipratropium-albuterol (DUONEB) nebulizer solution 1 ampule, Q4H PRN  dicyclomine (BENTYL) capsule 10 mg, TID PRN  sodium chloride flush 0.9 % injection 10 mL, 2 times per day  sodium chloride flush 0.9 % injection 10 mL, PRN  0.9 % sodium chloride infusion, PRN  ondansetron (ZOFRAN) injection 4 mg, Q4H PRN  polyethylene glycol (GLYCOLAX) packet 17 g, Daily PRN  acetaminophen (TYLENOL) tablet 650 mg, Q4H PRN   Or  acetaminophen (TYLENOL) suppository 650 mg, Q4H PRN  levothyroxine (SYNTHROID) tablet 50 mcg, Daily  tamsulosin (FLOMAX) capsule 0.4 mg, Daily  metoprolol succinate (TOPROL XL) extended release tablet 100 mg, Daily  dilTIAZem (CARDIZEM) tablet 60 mg, 4 times per day  calcium carbonate (TUMS) chewable tablet 500 mg, TID PRN        Objective:  /70   Pulse (!) 120   Temp 98 °F (36.7 °C) (Oral)   Resp 26   Ht 5' 7\" (1.702 m)   Wt 139 lb 8.8 oz (63.3 kg)   SpO2 97%   BMI 21.86 kg/m² Intake/Output Summary (Last 24 hours) at 6/19/2022 1200  Last data filed at 6/19/2022 0759  Gross per 24 hour   Intake 0 ml   Output 400 ml   Net -400 ml      Wt Readings from Last 3 Encounters:   06/19/22 139 lb 8.8 oz (63.3 kg)       Eyes: Sclera clear. Pupils equal.  ENT: Moist oral mucosa. Trachea midline, no adenopathy. Cardiovascular: Irregularly irregular regular rhythm, normal S1, S2. Systolic murmur. No edema in lower extremities  Respiratory: Currently patient is not using accessory muscles. Good inspiratory effort. Scattered wheezes, better than before. Musculoskeletal: No cyanosis in digits, neck supple  Neurology: CN 2-12 grossly intact. No speech or motor deficits  Psych: Normal affect. Alert and oriented in time, place and person  Skin: Warm, dry, normal turgor  Extremity exam shows brisk capillary refill. Peripheral pulses are palpable in lower extremities     I examined the patient today (06/19/22). Physical exam is similar to yesterday (6/18). Labs and Tests:  CBC:   Recent Labs     06/17/22  0306 06/17/22  0845 06/18/22  0236 06/18/22  0503 06/19/22  0620   WBC 6.7  --   --  6.7 6.0   HGB 7.0*   < > 7.0* 7.3* 7.4*     --   --  259 298    < > = values in this interval not displayed. BMP:    Recent Labs     06/17/22  0306 06/18/22  0503 06/19/22  0620    137 138   K 4.7 3.8 4.1    100 101   CO2 27 29 30   BUN 22* 18 19   CREATININE 1.1 1.3 1.1   GLUCOSE 102* 89 94     Hepatic:   No results for input(s): AST, ALT, ALB, BILITOT, ALKPHOS in the last 72 hours. CT ABDOMEN PELVIS W IV CONTRAST Additional Contrast? None   Final Result   1. Compression deformities of the L1 and L4 vertebral bodies, age indeterminate. 2. Moderate right and small left pleural effusions. 3. Colonic diverticulosis without diverticulitis. XR CHEST PORTABLE   Final Result      1. Or multifocal airspace disease concerning for multifocal pneumonia. 2.  Bilateral pleural effusions. Discussed care with family at [address] Nishant Damon MD   6/19/2022 12:00 PM

## 2022-06-19 NOTE — PROGRESS NOTES
Pt declined shift updates this AM.    Electronically signed by Homa Shane RN on 9/23/3969 at 6:18 AM

## 2022-06-19 NOTE — PLAN OF CARE
Problem: Safety - Adult  Goal: Free from fall injury  6/19/2022 0230 by Yolanda Soares RN  Note: Pt is a Fall Risk. See Rick Blair Fall Risk Score. Pt bed in low position and side rails up. Call light and belongings in reach. Pt encouraged to call for assistance. Will continue with hourly rounds for PO intake, pain needs, toileting, and repositioning as needed. Problem: Pain  Goal: Verbalizes/displays adequate comfort level or baseline comfort level  6/18/2022 2125 by Jonathon Warren RN  Outcome: Progressing  Flowsheets (Taken 6/18/2022 2125)  Verbalizes/displays adequate comfort level or baseline comfort level:   Encourage patient to monitor pain and request assistance   Assess pain using appropriate pain scale   Administer analgesics based on type and severity of pain and evaluate response   Implement non-pharmacological measures as appropriate and evaluate response  Note: Encouraged pt to reposition and rest to ease abdominal discomfort/cramping. Pharmaceutical interventions also utilized. Pt used pain scale appropriately and was encouraged to report pain.      Problem: Gastrointestinal - Adult  Goal: Maintains or returns to baseline bowel function  6/19/2022 0230 by Yolanda Soares RN  Flowsheets (Taken 6/19/2022 0230)  Maintains or returns to baseline bowel function:   Assess bowel function    Problem: Hematologic - Adult  Goal: Maintains hematologic stability  Outcome: Progressing  Flowsheets (Taken 6/19/2022 0230)  Maintains hematologic stability:   Assess for signs and symptoms of bleeding or hemorrhage   Monitor labs for bleeding or clotting disorders     Problem: ABCDS Injury Assessment  Goal: Absence of physical injury  Outcome: Progressing  Flowsheets (Taken 6/19/2022 0230)  Absence of Physical Injury: Implement safety measures based on patient assessment    Encourage mobilization and activity   Encourage oral fluids to ensure adequate hydration   Administer ordered medications as needed

## 2022-06-20 VITALS
RESPIRATION RATE: 15 BRPM | HEART RATE: 84 BPM | SYSTOLIC BLOOD PRESSURE: 115 MMHG | DIASTOLIC BLOOD PRESSURE: 67 MMHG | TEMPERATURE: 98 F | WEIGHT: 141.09 LBS | HEIGHT: 67 IN | OXYGEN SATURATION: 97 % | BODY MASS INDEX: 22.15 KG/M2

## 2022-06-20 LAB
ANION GAP SERPL CALCULATED.3IONS-SCNC: 8 MMOL/L (ref 3–16)
BASOPHILS ABSOLUTE: 0 K/UL (ref 0–0.2)
BASOPHILS RELATIVE PERCENT: 0.6 %
BUN BLDV-MCNC: 18 MG/DL (ref 7–20)
CALCIUM SERPL-MCNC: 8.4 MG/DL (ref 8.3–10.6)
CHLORIDE BLD-SCNC: 103 MMOL/L (ref 99–110)
CO2: 29 MMOL/L (ref 21–32)
CREAT SERPL-MCNC: 1.1 MG/DL (ref 0.8–1.3)
EOSINOPHILS ABSOLUTE: 0.4 K/UL (ref 0–0.6)
EOSINOPHILS RELATIVE PERCENT: 6.1 %
GFR AFRICAN AMERICAN: >60
GFR NON-AFRICAN AMERICAN: >60
GLUCOSE BLD-MCNC: 84 MG/DL (ref 70–99)
HCT VFR BLD CALC: 23.4 % (ref 40.5–52.5)
HCT VFR BLD CALC: 24.3 % (ref 40.5–52.5)
HEMOGLOBIN: 7.5 G/DL (ref 13.5–17.5)
HEMOGLOBIN: 7.7 G/DL (ref 13.5–17.5)
LYMPHOCYTES ABSOLUTE: 0.7 K/UL (ref 1–5.1)
LYMPHOCYTES RELATIVE PERCENT: 11.2 %
MCH RBC QN AUTO: 28.4 PG (ref 26–34)
MCHC RBC AUTO-ENTMCNC: 31.9 G/DL (ref 31–36)
MCV RBC AUTO: 89 FL (ref 80–100)
MONOCYTES ABSOLUTE: 0.6 K/UL (ref 0–1.3)
MONOCYTES RELATIVE PERCENT: 9 %
NEUTROPHILS ABSOLUTE: 4.6 K/UL (ref 1.7–7.7)
NEUTROPHILS RELATIVE PERCENT: 73.1 %
PDW BLD-RTO: 17.2 % (ref 12.4–15.4)
PLATELET # BLD: 307 K/UL (ref 135–450)
PMV BLD AUTO: 7 FL (ref 5–10.5)
POTASSIUM REFLEX MAGNESIUM: 4.3 MMOL/L (ref 3.5–5.1)
RBC # BLD: 2.63 M/UL (ref 4.2–5.9)
SODIUM BLD-SCNC: 140 MMOL/L (ref 136–145)
WBC # BLD: 6.3 K/UL (ref 4–11)

## 2022-06-20 PROCEDURE — 6370000000 HC RX 637 (ALT 250 FOR IP): Performed by: INTERNAL MEDICINE

## 2022-06-20 PROCEDURE — 2580000003 HC RX 258: Performed by: INTERNAL MEDICINE

## 2022-06-20 PROCEDURE — 85014 HEMATOCRIT: CPT

## 2022-06-20 PROCEDURE — 6370000000 HC RX 637 (ALT 250 FOR IP): Performed by: NURSE PRACTITIONER

## 2022-06-20 PROCEDURE — 94761 N-INVAS EAR/PLS OXIMETRY MLT: CPT

## 2022-06-20 PROCEDURE — 36415 COLL VENOUS BLD VENIPUNCTURE: CPT

## 2022-06-20 PROCEDURE — 6360000002 HC RX W HCPCS: Performed by: INTERNAL MEDICINE

## 2022-06-20 PROCEDURE — 80048 BASIC METABOLIC PNL TOTAL CA: CPT

## 2022-06-20 PROCEDURE — 85018 HEMOGLOBIN: CPT

## 2022-06-20 PROCEDURE — 99233 SBSQ HOSP IP/OBS HIGH 50: CPT | Performed by: NURSE PRACTITIONER

## 2022-06-20 PROCEDURE — 2700000000 HC OXYGEN THERAPY PER DAY

## 2022-06-20 PROCEDURE — 85025 COMPLETE CBC W/AUTO DIFF WBC: CPT

## 2022-06-20 RX ORDER — DILTIAZEM HYDROCHLORIDE 120 MG/1
120 CAPSULE, COATED, EXTENDED RELEASE ORAL 2 TIMES DAILY
Status: DISCONTINUED | OUTPATIENT
Start: 2022-06-20 | End: 2022-06-20 | Stop reason: HOSPADM

## 2022-06-20 RX ADMIN — DILTIAZEM HYDROCHLORIDE 60 MG: 60 TABLET, FILM COATED ORAL at 06:06

## 2022-06-20 RX ADMIN — SODIUM CHLORIDE: 9 INJECTION, SOLUTION INTRAVENOUS at 12:46

## 2022-06-20 RX ADMIN — METOPROLOL SUCCINATE 100 MG: 100 TABLET, EXTENDED RELEASE ORAL at 09:06

## 2022-06-20 RX ADMIN — LEVOTHYROXINE SODIUM 50 MCG: 50 TABLET ORAL at 06:07

## 2022-06-20 RX ADMIN — PANTOPRAZOLE SODIUM 40 MG: 40 TABLET, DELAYED RELEASE ORAL at 16:10

## 2022-06-20 RX ADMIN — DICYCLOMINE HYDROCHLORIDE 10 MG: 10 CAPSULE ORAL at 09:40

## 2022-06-20 RX ADMIN — DICYCLOMINE HYDROCHLORIDE 10 MG: 10 CAPSULE ORAL at 16:10

## 2022-06-20 RX ADMIN — PANTOPRAZOLE SODIUM 40 MG: 40 TABLET, DELAYED RELEASE ORAL at 06:06

## 2022-06-20 RX ADMIN — TAMSULOSIN HYDROCHLORIDE 0.4 MG: 0.4 CAPSULE ORAL at 09:06

## 2022-06-20 RX ADMIN — SODIUM CHLORIDE, PRESERVATIVE FREE 10 ML: 5 INJECTION INTRAVENOUS at 09:06

## 2022-06-20 RX ADMIN — IRON SUCROSE 200 MG: 20 INJECTION, SOLUTION INTRAVENOUS at 12:50

## 2022-06-20 ASSESSMENT — PAIN DESCRIPTION - PAIN TYPE
TYPE: ACUTE PAIN
TYPE: ACUTE PAIN

## 2022-06-20 ASSESSMENT — PAIN DESCRIPTION - ONSET
ONSET: ON-GOING
ONSET: ON-GOING

## 2022-06-20 ASSESSMENT — PAIN DESCRIPTION - LOCATION
LOCATION: ABDOMEN
LOCATION: ABDOMEN

## 2022-06-20 ASSESSMENT — PAIN SCALES - GENERAL
PAINLEVEL_OUTOF10: 0
PAINLEVEL_OUTOF10: 6
PAINLEVEL_OUTOF10: 0
PAINLEVEL_OUTOF10: 4
PAINLEVEL_OUTOF10: 4

## 2022-06-20 ASSESSMENT — PAIN DESCRIPTION - ORIENTATION
ORIENTATION: RIGHT
ORIENTATION: RIGHT

## 2022-06-20 ASSESSMENT — PAIN DESCRIPTION - FREQUENCY
FREQUENCY: CONTINUOUS
FREQUENCY: CONTINUOUS

## 2022-06-20 ASSESSMENT — PAIN DESCRIPTION - DESCRIPTORS
DESCRIPTORS: CRAMPING
DESCRIPTORS: CRAMPING

## 2022-06-20 ASSESSMENT — PAIN - FUNCTIONAL ASSESSMENT
PAIN_FUNCTIONAL_ASSESSMENT: ACTIVITIES ARE NOT PREVENTED
PAIN_FUNCTIONAL_ASSESSMENT: ACTIVITIES ARE NOT PREVENTED

## 2022-06-20 NOTE — PROGRESS NOTES
HOSPITALISTS PROGRESS NOTE    6/20/2022 2:52 PM        Name: Lindsay Mason .              Admitted: 6/16/2022  Primary Care Provider: No primary care provider on file. (Tel: None)      Problem List  Principal Problem:    GI bleed  Active Problems:    Acquired hypothyroidism    Aortic stenosis    BPH (benign prostatic hyperplasia)    COPD (chronic obstructive pulmonary disease) (HCC)    Essential hypertension    Chronic diastolic (congestive) heart failure (HCC)    Atrial fibrillation with rapid ventricular response (HCC)    CAD (coronary artery disease)  Resolved Problems:    * No resolved hospital problems. *       Assessment & Plan:   Acute blood loss anemia related to GI bleeding  S/p EGD, did a recent colonoscopy, diet has been advanced, H&H is stable,    Respiratory distress with audible wheezing probably related to fluid overload during the stay  Improved with IV Lasix    Abdominal pain  Etiology unclear, as needed morphine, CT abdomen with contrast done, GI is on consult, as needed Bentyl, since we do not know the source of bleeding, can be AVMs, patient is on Eliquis for atrial fibrillation, awaiting GI input regarding anticoagulation resumption, if patient is high risk for anticoagulation resumption might have to consider watchman procedure as per EP      History of severe AS patient has been scheduled for TAVR  Bilateral pleural effusion related to chronic atelectasis on review of recent hospitalization at [facility]  Patient is scheduled for T[facility] on end of July at [facility]    IV Access: Peripheral  Zamora: No  Diet: ADULT ORAL NUTRITION SUPPLEMENT; Breakfast, Lunch, Dinner; Clear Liquid Oral Supplement  ADULT DIET; Easy to Chew; please add STRAWBERRY ensure to tray  Code:Full Code  DVT PPX SCDs  Disposition monitor in the hospital  CC: Abdominal pain and shortness of breath    Subjective:  .   Son-in-law and daughter at bedside, patient appears comfortable, patient denies any new complaints, repeat H&H showed stable hemoglobin  Reviewed interval ancillary notes    Current Medications  dilTIAZem (CARDIZEM CD) extended release capsule 120 mg, BID  albuterol (PROVENTIL) nebulizer solution 2.5 mg, Q4H PRN  pantoprazole (PROTONIX) tablet 40 mg, BID AC  morphine (PF) injection 1 mg, Q4H PRN  ipratropium-albuterol (DUONEB) nebulizer solution 1 ampule, Q4H PRN  dicyclomine (BENTYL) capsule 10 mg, TID PRN  sodium chloride flush 0.9 % injection 10 mL, 2 times per day  sodium chloride flush 0.9 % injection 10 mL, PRN  0.9 % sodium chloride infusion, PRN  ondansetron (ZOFRAN) injection 4 mg, Q4H PRN  polyethylene glycol (GLYCOLAX) packet 17 g, Daily PRN  acetaminophen (TYLENOL) tablet 650 mg, Q4H PRN   Or  acetaminophen (TYLENOL) suppository 650 mg, Q4H PRN  levothyroxine (SYNTHROID) tablet 50 mcg, Daily  tamsulosin (FLOMAX) capsule 0.4 mg, Daily  metoprolol succinate (TOPROL XL) extended release tablet 100 mg, Daily  calcium carbonate (TUMS) chewable tablet 500 mg, TID PRN        Objective:  BP (!) 103/53   Pulse 82   Temp 97.7 °F (36.5 °C) (Oral)   Resp 20   Ht 5' 7\" (1.702 m)   Wt 141 lb 1.5 oz (64 kg)   SpO2 97%   BMI 22.10 kg/m²     Intake/Output Summary (Last 24 hours) at 6/20/2022 1452  Last data filed at 6/20/2022 1402  Gross per 24 hour   Intake 460 ml   Output 375 ml   Net 85 ml      Wt Readings from Last 3 Encounters:   06/20/22 141 lb 1.5 oz (64 kg)       Patient appears comfortable  No significant abdominal tenderness   Eyes: Sclera clear. Pupils equal.  ENT: Moist oral mucosa. Trachea midline, no adenopathy. Cardiovascular: Regular rhythm, normal S1, S2. Systolic murmur. No edema in lower extremities  Respiratory:  using accessory muscles. Good inspiratory effort. Musculoskeletal: No cyanosis in digits, neck supple  Neurology: CN 2-12 grossly intact. No speech or motor deficits  Psych: Normal affect.  Alert and oriented in time, place and person  Skin: Warm, dry, normal turgor  Extremity exam shows brisk capillary refill. Peripheral pulses are palpable in lower extremities     Labs and Tests:  CBC:   Recent Labs     06/18/22  0503 06/18/22  0503 06/19/22  0620 06/20/22  0531 06/20/22  1247   WBC 6.7  --  6.0 6.3  --    HGB 7.3*   < > 7.4* 7.5* 7.7*     --  298 307  --     < > = values in this interval not displayed. BMP:    Recent Labs     06/18/22  0503 06/19/22  0620 06/20/22  0531    138 140   K 3.8 4.1 4.3    101 103   CO2 29 30 29   BUN 18 19 18   CREATININE 1.3 1.1 1.1   GLUCOSE 89 94 84     Hepatic:   No results for input(s): AST, ALT, ALB, BILITOT, ALKPHOS in the last 72 hours. CT ABDOMEN PELVIS W IV CONTRAST Additional Contrast? None   Final Result   1. Compression deformities of the L1 and L4 vertebral bodies, age indeterminate. 2. Moderate right and small left pleural effusions. 3. Colonic diverticulosis without diverticulitis. XR CHEST PORTABLE   Final Result      1. Or multifocal airspace disease concerning for multifocal pneumonia. 2.  Bilateral pleural effusions.              Discussed care with family    Sheyla Ascencio MD   6/20/2022 2:52 PM

## 2022-06-20 NOTE — PROGRESS NOTES
Assisted pt in ambulating through hallway with portable telemetry ~50 ft. Pt denies shortness of breath, dizziness, or chest pain. Pt tolerated well. Ambulation encouraged.     Electronically signed by Chanelle Hughes RN on 9/22/5158 at 9:46 PM

## 2022-06-20 NOTE — PROGRESS NOTES
Pt d/c @ 1630 on 3L O2 (baseline for pt.). IVs and telemetry removed. Discharge instructions and education reviewed. All questions asked and answered.

## 2022-06-20 NOTE — PLAN OF CARE
Problem: Discharge Planning  Goal: Discharge to home or other facility with appropriate resources  Outcome: Progressing  Flowsheets (Taken 6/20/2022 1201)  Discharge to home or other facility with appropriate resources:   Identify barriers to discharge with patient and caregiver   Arrange for needed discharge resources and transportation as appropriate   Identify discharge learning needs (meds, wound care, etc)  Note: Continuing to closely monitor v/s and I&Os. Pt vitals are WNL. No blood in stool. Pt experienced some cramping this am.  Pt on continuous telemetry. Afib on tele. Problem: Skin/Tissue Integrity  Goal: Absence of new skin breakdown  Description: 1. Monitor for areas of redness and/or skin breakdown  2. Assess vascular access sites hourly  3. Every 4-6 hours minimum:  Change oxygen saturation probe site  4. Every 4-6 hours:  If on nasal continuous positive airway pressure, respiratory therapy assess nares and determine need for appliance change or resting period. Outcome: Progressing  Note: Pt turns Q2 and PRN. Heels floated. No new signs of skin breakdown seen. Problem: Pain  Goal: Verbalizes/displays adequate comfort level or baseline comfort level  6/20/2022 1201 by Paresh Rincon RN  Outcome: Progressing  Flowsheets (Taken 6/20/2022 1201)  Verbalizes/displays adequate comfort level or baseline comfort level:   Encourage patient to monitor pain and request assistance   Assess pain using appropriate pain scale   Administer analgesics based on type and severity of pain and evaluate response   Implement non-pharmacological measures as appropriate and evaluate response  Note: Pain communicated appropriately. Ice therapy, rest, repositioning, and medication used to treat pain.

## 2022-06-20 NOTE — PLAN OF CARE
Hemoglobin stable. Planning for discharge if okay with GI. Need to discuss with GI regarding anticoagulation recommendations given patient was on Eliquis for atrial fibrillation.

## 2022-06-20 NOTE — PROGRESS NOTES
Electrophysiology - PROGRESS NOTE    Admit Date: 6/16/2022     Chief Complaint: AF/RVR     Interval History:   Patient seen and examined and notes reviewed. Patient is being followed for AF/RVR. Pt p/w concerns of BRBPR, abdominal pain, concern for acute blood loss anemia and PRBC transfused. Found to be in AF/RVR, placed on dilt gtt. Underwent EGD showing no evidence of active bleed. Remains on IV Protonix, po diltiazem. HR elevated yesterday, pt digitalized with improvement in vitals. Received iron transfusion yesterday.     In: 56 [P.O.:480; I.V.:10]  Out: 375    Wt Readings from Last 2 Encounters:   06/20/22 141 lb 1.5 oz (64 kg)         Data:   Scheduled Meds:   Scheduled Meds:   iron sucrose  200 mg IntraVENous Q24H    pantoprazole  40 mg Oral BID AC    sodium chloride flush  10 mL IntraVENous 2 times per day    levothyroxine  50 mcg Oral Daily    tamsulosin  0.4 mg Oral Daily    metoprolol succinate  100 mg Oral Daily    dilTIAZem  60 mg Oral 4 times per day     Continuous Infusions:   sodium chloride 25 mL/hr at 06/19/22 1413     PRN Meds:.albuterol, morphine, ipratropium-albuterol, dicyclomine, sodium chloride flush, sodium chloride, ondansetron, polyethylene glycol, acetaminophen **OR** acetaminophen, calcium carbonate  Continuous Infusions:   sodium chloride 25 mL/hr at 06/19/22 1413       Intake/Output Summary (Last 24 hours) at 6/20/2022 0926  Last data filed at 6/20/2022 0825  Gross per 24 hour   Intake 490 ml   Output 375 ml   Net 115 ml       CBC:   Lab Results   Component Value Date    WBC 6.3 06/20/2022    HGB 7.5 06/20/2022     06/20/2022     BMP:  Lab Results   Component Value Date     06/20/2022    K 4.3 06/20/2022     06/20/2022    CO2 29 06/20/2022    BUN 18 06/20/2022    CREATININE 1.1 06/20/2022    GLUCOSE 84 06/20/2022     INR: No results found for: INR     CARDIAC LABS  ENZYMES:No results for input(s): CKMB, CKMBINDEX, TROPONINI in the last 72 hours. Invalid input(s): CKTOTAL;3  FASTING LIPID PANEL:No results found for: HDL, LDLDIRECT, LDLCALC, TRIG, TSH  LIVER PROFILE:  Lab Results   Component Value Date    AST 13 06/16/2022    ALT 6 06/16/2022       -----------------------------------------------------------------  Telemetry: Personally reviewed  AF, Hr improved    Objective:   Vitals: BP (!) 102/56   Pulse 71   Temp 97.8 °F (36.6 °C) (Oral)   Resp 20   Ht 5' 7\" (1.702 m)   Wt 141 lb 1.5 oz (64 kg)   SpO2 96%   BMI 22.10 kg/m²   General appearance: alert, appears stated age and cooperative, No acute distress   Skin: Skin color, texture, turgor normal. No rashes or ecchymosis. Neck: no JVD, supple, symmetrical, trachea midline   Lungs: , no accessory muscle use, no respiratory distress  Heart: irregularly irregular  Extremities: No edema, DP +  Psychiatric: normal insight and affect    Patient Active Problem List:     GI bleed     Acquired hypothyroidism     Aortic stenosis     Atrial fibrillation (HCC)     BPH (benign prostatic hyperplasia)     COPD (chronic obstructive pulmonary disease) (HCC)     Essential hypertension     Chronic diastolic (congestive) heart failure (HCC)     Gastric ulcer     Atrial fibrillation with rapid ventricular response (HCC)     CAD (coronary artery disease)        Assessment & Plan:    1. GIB  2. AF/RVR  3.  Severe AS    Joey Ziegler. is a 81 y.o. man w/ PMH HTN, hypothyroidism, CAD, COPD, severe AS with planned TAVR 5/5909, chronic diastolic HF, persistent AF, GIB who p/w BRBPR and abdominal pain, given 2 units PRBC, found to be in AF/RVR, placed on diltiazem gtt     AF/RVR  - In AF, HR improved  - CHADSVASc at least 4  - No OAC d/t bleeding, may need to consider watchman outpt  - On po dilt 60 mg Q6H- will change to home dose 120 mg BID  - s/p digitalization  - On Toprol 100 mg QD  - Continuous tele  - Keep K >4.0, Mg >2.0, replacements ordered  - TSH 7.24, T4 1.3  - Reviewed recent echo from [facility] (5/2022)  - [address] cardiology at [facility]  - ok to DC per EP  - ECG ordered and results personally reviewed      Severe AS  - Seen on Echo   - Plan for TAVR at [facility] 7/2022     GIB  - On IV protonix  - asa, eliquis on hold  - EGD showed no evidence of upper GIB  - GI following     EF of 68-42%  No known systolic HF  ASA and Statin for CAD  Anticoagulation for AF  No tobacco use.        Electronically signed by Douglas Szymanski

## 2022-06-20 NOTE — CARE COORDINATION
Case Management Assessment            Discharge Note                    Date / Time of Note: 6/20/2022 3:47 PM                  Discharge Note Completed by: Jetty Apley, RN    Patient Name: Gray Pedroza. YOB: 1935  Diagnosis: GI bleed [K92.2]   Date / Time: 6/16/2022  2:18 AM    Current PCP: No primary care provider on file. Clinic patient: No    Hospitalization in the last 30 days: No    Advance Directives:  Code Status: Full Code  [place] DNR form completed and on chart: Not Indicated    Financial:  Payor: Laura [place] / Plan: [place] PPO / Product Type: *No Product type* /      Pharmacy:    [facility], [address]  Phone: [phone] Fax: [phone]      Assistance purchasing medications?:    Assistance provided by Case Management: None at this time    Does patient want to participate in local refill/ meds to beds program?: Yes    Meds To Beds General Rules:  1. Can ONLY be done Monday- Friday between 8:30am-5pm  2. Prescription(s) must be in pharmacy by 3pm to be filled same day  3. Copy of patient's insurance/ prescription drug card and patient face sheet must be sent along with the prescription(s)  4. Cost of Rx cannot be added to hospital bill. If financial assistance is needed, please contact unit  or ;  or  CANNOT provide pharmacy voucher for patients co-pays  5.  Patients can then  the prescription on their way out of the hospital at discharge, or pharmacy can deliver to the bedside if staff is available. (payment due at time of pick-up or delivery - cash, check, or card accepted)     Able to afford home medications/ co-pay costs: Yes    ADLS:  Current PT AM-PAC Score:   /24  Current OT AM-PAC Score:   /24      DISCHARGE Disposition: Home with [address] [place] [address] [facility]     LOC at discharge: Not Applicable  JASWANT Completed: Not Indicated    Notification completed in HENS/PAS?:  Not Applicable    IMM Completed:   Yes, Case management has presented and reviewed IMM letter #2 to the patient and/or family/ POA. Patient and/or family/POA verbalized understanding of their medicare rights and appeal process if needed. Patient and/or family/POA has signed, initialed and placed today's date (6/20/22) and time ([phone]) on IMM letter #2 on the the appropriate lines. Patient and/or family/POA, copy of letter offered and they are aware that this original copy of IMM letter #2 is available prior to discharge from the paper chart on the unit. Electronic documentation has been entered into epic for IMM letter #2 and original paper copy has been added to the paper chart at the nurses station. Transportation:  Transportation PLAN for discharge: family   Mode of Transport: Private Car  Reason for medical transport: Not Applicable  Name of [address] [place] [address], [address]: Not Applicable  Time of Transport: tbd    Transport form completed: Not Indicated    Home Care:  [address] ordered at discharge: yes  [address]: [employer]  Phone: [phone]  Fax: ?  Orders faxed: Yes    Durable Medical Equipment:  DME Provider: n/a  Equipment obtained during hospitalization: has needed DME    Home Oxygen and Respiratory Equipment:  Oxygen needed at discharge?: Yes  [facility]  Phone: [phone]   Portable tank available for discharge?: Yes    Dialysis:  Dialysis patient: No    Dialysis Center:  Not Applicable    Hospice Services:  Location: Not Applicable  Agency: Not Applicable    Consents signed: Not Indicated    Referrals made at [facility] for outpatient continued care:  Not Applicable    Additional CM Notes: Patient is from home with family assist, active with [facility], has needed DME and portable tank from [facility]. Family to transport home.     The Plan for Transition of Care is related to the following treatment goals of GI bleed [K92.2]    The Patient and/or patient representative Shyam Stafford and his family were provided with a choice of provider and agrees with the discharge plan Yes    Freedom of choice list was provided with basic dialogue that supports the patient's individualized plan of care/goals and shares the quality data associated with the providers.  Yes    Care Transitions patient: No    Edda Ramirez RN  [facility] [email or web address]  Case Management Department  Ph: [phone]  Fax: [phone]

## 2022-06-20 NOTE — PLAN OF CARE
Problem: Safety - Adult  Goal: Free from fall injury  Outcome: Progressing  Flowsheets (Taken 6/18/2022 2125 by Tricia Chavira RN)  Free From Fall Injury: Instruct family/caregiver on patient safety  Note: Pt is a Fall Risk. See Edwar Titoamy Fall Risk Score. Pt bed in low position and side rails up. Call light and belongings in reach. Pt encouraged to call for assistance. Will continue with hourly rounds for PO intake, pain needs, toileting, and repositioning as needed. Problem: Pain  Goal: Verbalizes/displays adequate comfort level or baseline comfort level  Outcome: Progressing  Flowsheets (Taken 6/18/2022 2125 by Tricia Chavira RN)  Verbalizes/displays adequate comfort level or baseline comfort level:   Encourage patient to monitor pain and request assistance   Assess pain using appropriate pain scale   Administer analgesics based on type and severity of pain and evaluate response   Implement non-pharmacological measures as appropriate and evaluate response  Note: Pt reports some back pain tonight. Tylenol administered and rest encouraged. Pain assessed q4h and as needed. Problem: Cardiovascular - Adult  Goal: Maintains optimal cardiac output and hemodynamic stability  Outcome: Progressing  Flowsheets (Taken 6/17/2022 1429 by Samantha Stout RN)  Maintains optimal cardiac output and hemodynamic stability:   Monitor blood pressure and heart rate   Monitor urine output and notify Licensed Independent Practitioner for values outside of normal range   Assess for signs of decreased cardiac output   Administer fluid and/or volume expanders as ordered  Note: Dig loaded today. HR sustaining 70s-80s A fib. SBP 94 for midnight vital signs, so diltiazem held. Pt denies chest pain or shortness of breath and is encouraged to notify RN if experiencing.         Problem: Gastrointestinal - Adult  Goal: Maintains or returns to baseline bowel function  Outcome: Progressing  Flowsheets (Taken 6/19/2022 0230 by Jake Richards Gilma Horton)  Maintains or returns to baseline bowel function:   Assess bowel function   Encourage mobilization and activity   Encourage oral fluids to ensure adequate hydration   Administer ordered medications as needed  Note: No Bms so far tonight. Pt denies ABD pain, nausea, or vomiting.

## 2022-06-20 NOTE — CARE COORDINATION
CTN contacted intake at [facility] [phone], they are active with patient.  Orders faxed to [phone] for JASEN by 6/22  Electronically signed by Natalya Colvin LPN on 6/47/6594 at 1:40 PM

## 2022-06-20 NOTE — DISCHARGE SUMMARY
HOSPITALISTS DISCHARGE SUMMARY    Patient Demographics    Patient. Casa Meyer Date of Birth. 1935  MRN. [id number]     Primary care provider. No primary care provider on file. (Tel: None)    Admit date: 6/16/2022    Discharge date (blank if same as Note Date): Note Date: 6/20/2022     Reason for Hospitalization. No chief complaint on file. Significant Findings. Principal Problem:    GI bleed  Active Problems:    Acquired hypothyroidism    Aortic stenosis    BPH (benign prostatic hyperplasia)    COPD (chronic obstructive pulmonary disease) (HCC)    Essential hypertension    Chronic diastolic (congestive) heart failure (HCC)    Atrial fibrillation with rapid ventricular response (HCC)    CAD (coronary artery disease)  Resolved Problems:    * No resolved hospital problems. *       Problems and results from this hospitalization that need follow up. 1. GI bleeding   2. Acute blood loss anemia  3. Iron deficiency anemia   4. Aortic stenosis     Significant test results and incidental findings. CT ABDOMEN PELVIS W IV CONTRAST Additional Contrast? None   Final Result   1. Compression deformities of the L1 and L4 vertebral bodies, age indeterminate. 2. Moderate right and small left pleural effusions. 3. Colonic diverticulosis without diverticulitis. XR CHEST PORTABLE   Final Result      1. Or multifocal airspace disease concerning for multifocal pneumonia. 2.  Bilateral pleural effusions. Invasive procedures and treatments. EGD  Plan:  No evidence of upper GIB and no evidence of prior ulcers, only few minor erosions - continue Protonix BID  I looked at photos of the blood he had in toilet at home and appeared red. May be lower source  He had colonoscopy at [facility] in April with diverticulosis and small polyp. He also developed significant tachycardia when he was prepping and had to be transferred to [facility] and put on diltiazem drip.    I do not think it is necessary to repeat colonoscopy at this time  1. If he rebleeds actively, could obtain tagged RBC scan    [address] [place] [address] Dr Renteria. 78-year-old male with a history of hypertension hypothyroidism COPD coronary disease chronic diastolic heart failure and severe aortic stenosis was scheduled for TAVR at [facility] on July 26 presented with GI bleed. Patient was recently hospitalized at [facility] in April and he did had an GI bleed at that time 2. Did had a EGD at that time which did show gastric ulcer. At that time was told to stop his aspirin and Eliquis for 2 weeks. Patient apparently presented to outside hospital after having 4 episodes of bright red blood per rectum. Patient was evaluated by GI. Patient did underwent EGD which was negative for any upper GI bleed source. Given high risk and recent colonoscopy no further colonoscopy was done. There was concern that patient might have small bowel AVMs. Patient's Eliquis was held. I did discuss with patient and patient's family regarding reinitiation of Eliquis. As patient already bled twice. Patient is scheduled for surgery next month discussed the family to see what will be the best course of action. Recommended to hold Eliquis for the time being though this does put him at high risk of stroke. Family aware of it. Son-in-law also mention that patient will be following with cardiology regarding anticoagulation as early as tomorrow. Other option would be considering a watchman procedure for atrial fibrillation.         Acute blood loss anemia related to GI bleeding  S/p EGD, did a recent colonoscopy, diet has been advanced, H&H is stable,     Respiratory distress with audible wheezing probably related to fluid overload during the stay  Improved with IV Lasix     Abdominal pain  Etiology unclear, as needed morphine, CT abdomen with contrast done, GI is on consult, as needed Bentyl, since we do not know the source of bleeding, can be AVMs, patient is on Eliquis for atrial fibrillation, awaiting GI input regarding anticoagulation resumption, if patient is high risk for anticoagulation resumption might have to consider watchman procedure as per EP        History of severe AS patient has been scheduled for TAVR  Bilateral pleural effusion related to chronic atelectasis on review of recent hospitalization at [facility]  Patient is scheduled for TAVR on end of July at [facility]. IP CONSULT TO GI  IP CONSULT TO CARDIOLOGY    Physical examination on discharge day. BP (!) 103/53   Pulse 82   Temp 97.7 °F (36.5 °C) (Oral)   Resp 20   Ht 5' 7\" (1.702 m)   Wt 141 lb 1.5 oz (64 kg)   SpO2 97%   BMI 22.10 kg/m²   General appearance. Alert. Looks comfortable. HEENT. Sclera clear. Moist mucus membranes. Cardiovascular. Regular rate and rhythm, normal S1, S2. No murmur. Respiratory. Not using accessory muscles. Clear to auscultation bilaterally, no wheeze. Gastrointestinal. Abdomen soft, non-tender, not distended, normal bowel sounds  Neurology. Facial symmetry. No speech deficits. Moving all extremities equally. Extremities. No edema in lower extremities. Skin. Warm, dry, normal turgor        Condition at time of discharge stable    Medication instructions provided to patient at discharge.      Medication List      CONTINUE taking these medications    albuterol sulfate  (90 Base) MCG/ACT inhaler  Commonly known as: PROVENTIL;VENTOLIN;PROAIR     dilTIAZem 120 MG extended release capsule  Commonly known as: TIAZAC     Euthyrox 50 MCG tablet  Generic drug: levothyroxine     ferrous sulfate 325 (65 Fe) MG EC tablet  Commonly known as: FE TABS 325     Flovent Diskus 100 MCG/BLIST Aepb inhaler  Generic drug: fluticasone propionate     fluocinolone 0.01 % cream     furosemide 20 MG tablet  Commonly known as: LASIX     metoprolol succinate 100 MG extended release tablet  Commonly known as: TOPROL XL     omeprazole 40 MG delayed release capsule  Commonly known as: PRILOSEC     Spiriva Respimat 2.5 MCG/ACT Aers inhaler  Generic drug: tiotropium     tamsulosin 0.4 MG capsule  Commonly known as: FLOMAX        STOP taking these medications    Eliquis 5 MG Tabs tablet  Generic drug: apixaban            Discharge recommendations given to patient. Follow Up. Primary care provider, cardiology  Disposition. home  Activity. activity as tolerated  Diet: ADULT ORAL NUTRITION SUPPLEMENT; Breakfast, Lunch, Dinner; Clear Liquid Oral Supplement  ADULT DIET; Easy to Chew; please add STRAWBERRY ensure to tray      Spent 35 minutes in discharge process.     Signed:  Bronson Ball MD     6/20/2022 3:28 PM

## 2022-06-21 LAB
BLOOD BANK DISPENSE STATUS: NORMAL
BLOOD BANK PRODUCT CODE: NORMAL
BPU ID: NORMAL
DESCRIPTION BLOOD BANK: NORMAL